# Patient Record
Sex: FEMALE | Race: WHITE | NOT HISPANIC OR LATINO | URBAN - METROPOLITAN AREA
[De-identification: names, ages, dates, MRNs, and addresses within clinical notes are randomized per-mention and may not be internally consistent; named-entity substitution may affect disease eponyms.]

---

## 2020-03-02 ENCOUNTER — TELEPHONE (OUTPATIENT)
Dept: OBGYN CLINIC | Facility: MEDICAL CENTER | Age: 25
End: 2020-03-02

## 2020-03-02 NOTE — TELEPHONE ENCOUNTER
Pt called and is 8 wks pregnant, she would like to transfer to our office  Had blood work and ultrasound done elsewhere  Informed her that we would need her prenatal records to continue setting her up at our office  Please call at [phone]  Thank you!

## 2020-03-04 ENCOUNTER — TELEPHONE (OUTPATIENT)
Dept: OBGYN CLINIC | Facility: MEDICAL CENTER | Age: 25
End: 2020-03-04

## 2020-03-04 NOTE — TELEPHONE ENCOUNTER
Pt returned call number listed in her chart incorrect  I entered the correct number [phone]  please call pt

## 2020-03-04 NOTE — TELEPHONE ENCOUNTER
States had dating US done at [place] and some bloodwork  Completed  Will contact office and have faxed to us    Will schedule appointment pending those results

## 2020-03-17 ENCOUNTER — INITIAL PRENATAL (OUTPATIENT)
Dept: OBGYN CLINIC | Facility: MEDICAL CENTER | Age: 25
End: 2020-03-17

## 2020-03-17 ENCOUNTER — TELEPHONE (OUTPATIENT)
Dept: OBGYN CLINIC | Facility: MEDICAL CENTER | Age: 25
End: 2020-03-17

## 2020-03-17 ENCOUNTER — APPOINTMENT (OUTPATIENT)
Dept: LAB | Facility: MEDICAL CENTER | Age: 25
End: 2020-03-17
Payer: COMMERCIAL

## 2020-03-17 DIAGNOSIS — Z34.91 ENCOUNTER FOR PREGNANCY RELATED EXAMINATION IN FIRST TRIMESTER: ICD-10-CM

## 2020-03-17 DIAGNOSIS — Z34.91 ENCOUNTER FOR PREGNANCY RELATED EXAMINATION IN FIRST TRIMESTER: Primary | ICD-10-CM

## 2020-03-17 DIAGNOSIS — O21.9 NAUSEA AND VOMITING IN PREGNANCY: Primary | ICD-10-CM

## 2020-03-17 LAB
ABO GROUP BLD: NORMAL
BACTERIA UR QL AUTO: ABNORMAL /HPF
BASOPHILS # BLD AUTO: 0.02 THOUSANDS/ΜL (ref 0–0.1)
BASOPHILS NFR BLD AUTO: 0 % (ref 0–1)
BILIRUB UR QL STRIP: NEGATIVE
BLD GP AB SCN SERPL QL: NEGATIVE
CAOX CRY URNS QL MICRO: ABNORMAL /HPF
CLARITY UR: ABNORMAL
COLOR UR: ABNORMAL
EOSINOPHIL # BLD AUTO: 0.07 THOUSAND/ΜL (ref 0–0.61)
EOSINOPHIL NFR BLD AUTO: 1 % (ref 0–6)
ERYTHROCYTE [DISTWIDTH] IN BLOOD BY AUTOMATED COUNT: 13.2 % (ref 11.6–15.1)
GLUCOSE UR STRIP-MCNC: NEGATIVE MG/DL
HBV SURFACE AG SER QL: NORMAL
HCT VFR BLD AUTO: 42 % (ref 34.8–46.1)
HGB BLD-MCNC: 14.5 G/DL (ref 11.5–15.4)
HGB UR QL STRIP.AUTO: NEGATIVE
IMM GRANULOCYTES # BLD AUTO: 0.03 THOUSAND/UL (ref 0–0.2)
IMM GRANULOCYTES NFR BLD AUTO: 0 % (ref 0–2)
KETONES UR STRIP-MCNC: ABNORMAL MG/DL
LEUKOCYTE ESTERASE UR QL STRIP: ABNORMAL
LYMPHOCYTES # BLD AUTO: 1.81 THOUSANDS/ΜL (ref 0.6–4.47)
LYMPHOCYTES NFR BLD AUTO: 15 % (ref 14–44)
MCH RBC QN AUTO: 29.4 PG (ref 26.8–34.3)
MCHC RBC AUTO-ENTMCNC: 34.5 G/DL (ref 31.4–37.4)
MCV RBC AUTO: 85 FL (ref 82–98)
MONOCYTES # BLD AUTO: 0.64 THOUSAND/ΜL (ref 0.17–1.22)
MONOCYTES NFR BLD AUTO: 5 % (ref 4–12)
MUCOUS THREADS UR QL AUTO: ABNORMAL
NEUTROPHILS # BLD AUTO: 9.61 THOUSANDS/ΜL (ref 1.85–7.62)
NEUTS SEG NFR BLD AUTO: 79 % (ref 43–75)
NITRITE UR QL STRIP: NEGATIVE
NON-SQ EPI CELLS URNS QL MICRO: ABNORMAL /HPF
NRBC BLD AUTO-RTO: 0 /100 WBCS
PH UR STRIP.AUTO: 6 [PH]
PLATELET # BLD AUTO: 229 THOUSANDS/UL (ref 149–390)
PMV BLD AUTO: 9.4 FL (ref 8.9–12.7)
PROT UR STRIP-MCNC: ABNORMAL MG/DL
RBC # BLD AUTO: 4.93 MILLION/UL (ref 3.81–5.12)
RBC #/AREA URNS AUTO: ABNORMAL /HPF
RH BLD: POSITIVE
RUBV IGG SERPL IA-ACNC: 19.8 IU/ML
SP GR UR STRIP.AUTO: 1.03 (ref 1–1.03)
SPECIMEN EXPIRATION DATE: NORMAL
UROBILINOGEN UR QL STRIP.AUTO: 0.2 E.U./DL
WBC # BLD AUTO: 12.18 THOUSAND/UL (ref 4.31–10.16)
WBC #/AREA URNS AUTO: ABNORMAL /HPF

## 2020-03-17 PROCEDURE — 36415 COLL VENOUS BLD VENIPUNCTURE: CPT

## 2020-03-17 PROCEDURE — 81001 URINALYSIS AUTO W/SCOPE: CPT

## 2020-03-17 PROCEDURE — NOBC: Performed by: OBSTETRICS & GYNECOLOGY

## 2020-03-17 PROCEDURE — 87086 URINE CULTURE/COLONY COUNT: CPT

## 2020-03-17 PROCEDURE — 80081 OBSTETRIC PANEL INC HIV TSTG: CPT

## 2020-03-17 RX ORDER — DOXYLAMINE SUCCINATE AND PYRIDOXINE HYDROCHLORIDE, DELAYED RELEASE TABLETS 10 MG/10 MG 10; 10 MG/1; MG/1
TABLET, DELAYED RELEASE ORAL
Qty: 60 TABLET | Refills: 2 | Status: SHIPPED | OUTPATIENT
Start: 2020-03-17 | End: 2020-04-29 | Stop reason: SDUPTHER

## 2020-03-17 NOTE — PROGRESS NOTES
OB INTAKE INTERVIEW      Pt presents for OB intake    OB History    Para Term  AB Living   2 1 1     1   SAB TAB Ectopic Multiple Live Births           1      # Outcome Date GA Lbr Nate/2nd Weight Sex Delivery Anes PTL Lv   2 Current            1 Term 11/15/15 38w0d  3033 g (6 lb 11 oz) M Vag-Spont None N LON     Twin Gestation    Hx of  delivery prior to 36 weeks 6 days:  NO     Last Menstrual Period:   Patient's last menstrual period was 2020 (exact date)  Ultrasound date:   2020 7 weeks 1 day   Estimated date of delivery:   Estimated Date of Delivery: 10/07/2020  confirmed by LMP     History of Diabetes: NO  History of Hypertension: NO      Infection Screening: Does the pt have a hx of MRSA? NO    H&P visit scheduled  ?  Interview education  Information on [facility] Pregnancy Essentials reviewed  Handouts given: Baby and Me phone tamar guide  Baby and Me support center  [facility] Morenorogel[facility] WhitmanV[facility]quez in Pregnancy information sheet   COVID-19: precautions and travel restrictions reviewed    Interview education    [facility]  Discussed genetic testing-    - pt interested    In SEQ  Screen  Information on CF and SMA carrier screening reviewed  Will let office know at next appointment if testing desired          Discussed Tdap and Influenza vaccines  Declined influenza vaccine         Depression Screening Follow-up Plan: Patient's depression screening was NEGATIVE  with an [place] score of  2                  The patient was oriented to our practice and all questions were answered    Interviewed by: Veronika Lui RN 20

## 2020-03-17 NOTE — PATIENT INSTRUCTIONS
Pregnancy at 7 to [address]:   What changes are happening to your body:  Pregnancy hormones may cause your body to go through many changes during this stage of your pregnancy  You may feel more tired than usual, and have mood swings, nausea and vomiting, and headaches  Your breasts may feel tender and swollen and you may urinate more frequently  Seek care immediately if:   · You have pain or cramping in your abdomen or low back  · You have heavy vaginal bleeding or clotting  · You pass material that looks like tissue or large clots  Collect the material and bring it with you  Contact your healthcare provider if:   · You have light bleeding  · You have chills or a fever  · You have vaginal itching, burning, or pain  · You have yellow, green, white, or foul-smelling vaginal discharge  · You have pain or burning when you urinate, less urine than usual, or pink or bloody urine  · You have questions or concerns about your condition or care  How to care for yourself at this stage of your pregnancy:   · Manage nausea and vomiting  Avoid fatty and spicy foods  Eat small meals throughout the day instead of large meals  Sarai may help to decrease nausea  Ask your healthcare provider about other ways of decreasing nausea and vomiting  · Eat a variety of healthy foods  Healthy foods include fruits, vegetables, whole-grain breads, low-fat dairy foods, beans, lean meats, and fish  Drink liquids as directed  Ask how much liquid to drink each day and which liquids are best for you  Limit caffeine to less than 200 milligrams each day  Limit your intake of fish to 2 servings each week  Choose fish low in mercury such as canned light tuna, shrimp, salmon, cod, or tilapia  Do not  eat fish high in mercury such as swordfish, tilefish, kate mackerel, and shark  · Take prenatal vitamins as directed    Your need for certain vitamins and minerals, such as folic acid, increases during pregnancy  Prenatal vitamins provide some of the extra vitamins and minerals you need  Prenatal vitamins may also help to decrease the risk of certain birth defects  · Ask how much weight you should gain each month  Too much or too little weight gain can be unhealthy for you and your baby  · Do not smoke  If you smoke, it is never too late to quit  Smoking increases your risk of a miscarriage and other health problems during your pregnancy  Smoking can cause your baby to be born too early or weigh less at birth  Ask your healthcare provider for information if you need help quitting  · Do not drink alcohol  Alcohol passes from your body to your baby through the placenta  It can affect your baby's brain development and cause fetal alcohol syndrome (FAS)  FAS is a group of conditions that causes mental, behavior, and growth problems  · Talk to your healthcare provider before you take any medicines  Many medicines may harm your baby if you take them when you are pregnant  Do not take any medicines, vitamins, herbs, or supplements without first talking to your healthcare provider  Never use illegal or street drugs (such as marijuana or cocaine) while you are pregnant  Safety tips during pregnancy:   · Avoid hot tubs and saunas  Do not use a hot tub or sauna while you are pregnant, especially during your first trimester  Hot tubs and saunas may raise your baby's temperature and increase the risk of birth defects  · Avoid toxoplasmosis  This is an infection caused by eating raw meat or being around infected cat feces  It can cause birth defects, miscarriages, and other problems  Wash your hands after you touch raw meat  Make sure any meat is well-cooked before you eat it  Avoid raw eggs and unpasteurized milk  Use gloves or ask someone else to clean your cat's litter box while you are pregnant    Changes that are happening with your baby:  By 10 weeks, your baby will be about 2 ½ inches long from the top of the head to the rump (baby's bottom)  Your baby weighs about ½ ounce  Major body organs, such as the brain, heart, and lungs, are forming  Your baby's facial features are also starting to form  What you need to know about prenatal care:  Prenatal care is a series of visits with your healthcare provider throughout your pregnancy  During the first 28 weeks of your pregnancy, you will see your healthcare provider once a month  Prenatal care can help prevent problems during pregnancy and childbirth  Your healthcare provider will ask questions about your health and any previous pregnancies you have had  He will also ask about any medicines you are taking  You may also need any of the following:  · A pap smear  will be done to check your cervix for abnormal cells  The cervix is the narrow opening at the bottom of your uterus  The cervix meets the top part of the vagina  · A pelvic exam  allows your healthcare provider to see your cervix (the bottom part of your uterus)  Your healthcare provider uses a speculum to gently open your vagina  He will check the size and shape of your uterus  · Blood tests  may be done to check for anemia or blood type  Your healthcare provider may also order other blood tests to check if you are immune to certain diseases such as Hepatitis B  He may also recommend an HIV test     · Urine tests  may also be done to check for signs of infection  · Your blood pressure and weight  will be checked  © 2017 [address] [employer] Information is for End User's use only and may not be sold, redistributed or otherwise used for commercial purposes  All illustrations and images included in CareNotes® are the copyrighted property of A D A M , Inc  or Uli Espinoza  The above information is an  only  It is not intended as medical advice for individual conditions or treatments   Talk to your doctor, nurse or pharmacist before following any medical regimen to see if it is safe and effective for you  Pregnancy at 11 to 14 Weeks   AMBULATORY CARE:   What changes are happening to your body: You are now at the end of your first trimester and entering your second trimester  Morning sickness usually goes away by this time  You may have other symptoms such as fatigue, frequent urination, and headaches  You may have gained between 2 to 4 pounds by now  Seek care immediately if:   · You have pain or cramping in your abdomen or low back  · You have heavy vaginal bleeding or clotting  · You pass material that looks like tissue or large clots  Collect the material and bring it with you  Contact your healthcare provider if:   · You cannot keep food or drinks down, and you are losing weight  · You have light bleeding  · You have chills or a fever  · You have vaginal itching, burning, or pain  · You have yellow, green, white, or foul-smelling vaginal discharge  · You have pain or burning when you urinate, less urine than usual, or pink or bloody urine  · You have questions or concerns about your condition or care  How to care for yourself at this stage of your pregnancy:   · Get plenty of rest   You may feel more tired than normal  You may need to take naps or go to bed earlier  · Manage nausea and vomiting  Avoid fatty and spicy foods  Eat small meals throughout the day instead of large meals  Sarai may help to decrease nausea  Ask your healthcare provider about other ways of decreasing nausea and vomiting  · Eat a variety of healthy foods  Healthy foods include fruits, vegetables, whole-grain breads, low-fat dairy foods, beans, lean meats, and fish  Drink liquids as directed  Ask how much liquid to drink each day and which liquids are best for you  Limit caffeine to less than 200 milligrams each day  Limit your intake of fish to 2 servings each week  Choose fish low in mercury such as canned light tuna, shrimp, salmon, cod, or tilapia   Do not  eat fish high in mercury such as swordfish, tilefish, kate mackerel, and shark  · Take prenatal vitamins as directed  Your need for certain vitamins and minerals, such as folic acid, increases during pregnancy  Prenatal vitamins provide some of the extra vitamins and minerals you need  Prenatal vitamins may also help to decrease the risk of certain birth defects  · Do not smoke  If you smoke, it is never too late to quit  Smoking increases your risk of a miscarriage and other health problems during your pregnancy  Smoking can cause your baby to be born too early or weigh less at birth  Ask your healthcare provider for information if you need help quitting  · Do not drink alcohol  Alcohol passes from your body to your baby through the placenta  It can affect your baby's brain development and cause fetal alcohol syndrome (FAS)  FAS is a group of conditions that causes mental, behavior, and growth problems  · Talk to your healthcare provider before you take any medicines  Many medicines may harm your baby if you take them when you are pregnant  Do not take any medicines, vitamins, herbs, or supplements without first talking to your healthcare provider  Never use illegal or street drugs (such as marijuana or cocaine) while you are pregnant  Safety tips during pregnancy:   · Avoid hot tubs and saunas  Do not use a hot tub or sauna while you are pregnant, especially during your first trimester  Hot tubs and saunas may raise your baby's temperature and increase the risk of birth defects  · Avoid toxoplasmosis  This is an infection caused by eating raw meat or being around infected cat feces  It can cause birth defects, miscarriages, and other problems  Wash your hands after you touch raw meat  Make sure any meat is well-cooked before you eat it  Avoid raw eggs and unpasteurized milk  Use gloves or ask someone else to clean your cat's litter box while you are pregnant    Changes that are happening with your baby: Your baby has fully formed fingernails and toenails  Your baby's heartbeat can now be heard  Ask your healthcare provider if you can listen to your baby's heartbeat  By week 14, your baby is over 4 inches long from the top of the head to the rump (baby's bottom)  Your baby weighs over 3 ounces  What you need to know about prenatal care:  During the first 28 weeks of your pregnancy, you will see your healthcare provider once a month  Prenatal care can help prevent problems during pregnancy and childbirth  Your healthcare provider will check your blood pressure and weight  You may also need any of the following:  · A urine test  may also be done to check for sugar and protein  These can be signs of gestational diabetes or infection  · Genetic disorders screening tests  may be offered to you  This screening test checks your baby's risk of genetic disorders such as Down syndrome  The screening test includes a blood test and ultrasound  · Your baby's heart rate  will be checked  © 2017 [address] [employer]  Information is for End User's use only and may not be sold, redistributed or otherwise used for commercial purposes  All illustrations and images included in CareNotes® are the copyrighted property of A D A M , Inc  or Uli Espinoza  The above information is an  only  It is not intended as medical advice for individual conditions or treatments  Talk to your doctor, nurse or pharmacist before following any medical regimen to see if it is safe and effective for you

## 2020-03-18 LAB
BACTERIA UR CULT: NORMAL
HIV 1+2 AB+HIV1 P24 AG SERPL QL IA: NORMAL
RPR SER QL: NORMAL

## 2020-03-24 ENCOUNTER — TELEPHONE (OUTPATIENT)
Dept: OBGYN CLINIC | Facility: MEDICAL CENTER | Age: 25
End: 2020-03-24

## 2020-03-24 NOTE — TELEPHONE ENCOUNTER
Per Tahir precert is not required for global maternity  Services will be covered at 80%  Once patient meets deductible services will be covered at 80% up to [place] [facility]

## 2020-03-31 ENCOUNTER — TELEMEDICINE (OUTPATIENT)
Dept: OBGYN CLINIC | Facility: MEDICAL CENTER | Age: 25
End: 2020-03-31

## 2020-03-31 VITALS — WEIGHT: 115 LBS

## 2020-03-31 DIAGNOSIS — O21.0 HYPEREMESIS GRAVIDARUM: ICD-10-CM

## 2020-03-31 DIAGNOSIS — Z3A.12 12 WEEKS GESTATION OF PREGNANCY: Primary | ICD-10-CM

## 2020-03-31 DIAGNOSIS — O30.041 DICHORIONIC DIAMNIOTIC TWIN PREGNANCY IN FIRST TRIMESTER: ICD-10-CM

## 2020-03-31 DIAGNOSIS — Z34.91 FIRST TRIMESTER PREGNANCY: ICD-10-CM

## 2020-03-31 DIAGNOSIS — Z11.3 SCREENING EXAMINATION FOR STD (SEXUALLY TRANSMITTED DISEASE): ICD-10-CM

## 2020-03-31 PROCEDURE — PNV: Performed by: OBSTETRICS & GYNECOLOGY

## 2020-03-31 RX ORDER — ONDANSETRON 4 MG/1
4 TABLET, FILM COATED ORAL EVERY 8 HOURS PRN
Qty: 30 TABLET | Refills: 2 | Status: SHIPPED | OUTPATIENT
Start: 2020-03-31 | End: 2020-04-29 | Stop reason: SDUPTHER

## 2020-03-31 NOTE — PROGRESS NOTES
Virtual Regular Visit         Reason for visit is initial OB visit    Encounter provider Antony Salvador MD    Provider located at [address] [place] [address]  [facility] [address] [address]      Recent Visits  Date Type Provider Dept   20 Telephone Sundeep Sparks recent visits within past 7 days and meeting all other requirements     Today's Visits  Date Type Provider Dept   [facility]'s visits and meeting all other requirements     Future Appointments  No visits were found meeting these conditions  Showing future appointments within next 150 days and meeting all other requirements        The patient was identified by name and date of birth  Vida Jarvis was informed that this is a telemedicine visit and that the visit is being conducted through [employer]  My office door was closed  No one else was in the room  She acknowledged consent and understanding of privacy and security of the video platform  The patient has agreed to participate and understands they can discontinue the visit at any time  Patient is aware this is a billable service  Subjective    Vida Jarvis is a 30 y o   at 14w9d with dichorionic, diamniotic twins who presents for routine prenatal visit  She is reporting severe nausea and vomiting of pregnancy  Notes she has already lost about 10-12lbs since finding out she was pregnant  Nauseated all day long, vomits frequently  Also notes she had a fever last night to 100 4, resolved now  No other symptoms with the fever except vomiting  Denies cough or exposure to known sick contact  Patient notes that this pregnancy was planned  She was not using contraception at the time  She reports she is certain of her LMP and that she has regular menses   She has has no vaginal bleeding since her LMP  Patient's PMH is uncomplicated  Her prior pregnancies have been uncomplicated  She denies a hx of STD/STI, denies a hx of TB or close contacts with persons with TB  She denies a family history of inheritable conditions such as physical or intellectual disabilities, birth defects, blood disorders, heart or neural tube defects  She has never had MRSA  She denies recent travel or travel planned in the near future  Patient Active Problem List   Diagnosis    12 weeks gestation of pregnancy    First trimester pregnancy    Dichorionic diamniotic twin pregnancy in first trimester    Hyperemesis gravidarum       Past Medical History:   Diagnosis Date    Depression     no medications    Urinary tract infection     Varicella        History reviewed  No pertinent surgical history  Current Outpatient Medications   Medication Sig Dispense Refill    Doxylamine-Pyridoxine (Diclegis) 10-10 MG TBEC Start with 2tabs QHS If no improvement in 48 hours, may increase to include 1 tablet QAM and 1 tablet QPM to max  Of 4 tabs/24hours 60 tablet 2    Prenatal Vit-Fe Fumarate-FA (PRENATAL 19 PO) Take 1 tablet by mouth daily      ondansetron (ZOFRAN) 4 mg tablet Take 1 tablet (4 mg total) by mouth every 8 (eight) hours as needed for nausea or vomiting 30 tablet 2     No current facility-administered medications for this visit  No Known Allergies    Review of Systems   Constitutional: Positive for fatigue and fever  Negative for chills  HENT: Negative for congestion, rhinorrhea and sinus pressure  Respiratory: Negative for cough and shortness of breath  Gastrointestinal: Positive for nausea and vomiting  Negative for abdominal distention, abdominal pain, constipation and diarrhea  Genitourinary: Positive for pelvic pain  Negative for difficulty urinating, dysuria, frequency, genital sores, urgency, vaginal bleeding, vaginal discharge and vaginal pain     Musculoskeletal: Negative for arthralgias and myalgias  Skin: Negative for rash  Physical Exam   Constitutional: She appears well-developed and well-nourished  No distress  Eyes: Conjunctivae are normal  Right eye exhibits no discharge  Left eye exhibits no discharge  No scleral icterus  Pulmonary/Chest: Effort normal  No respiratory distress  Abdominal:   Intermittently wretching on exam   Neurological: She is alert  Skin: She is not diaphoretic  Psychiatric: She has a normal mood and affect  Her behavior is normal         Assessment  30 y o  Eugenio Starkey at [address] with dichorionic, diamniotic twins who presents for routine prenatal visit  Plan  Diagnoses and all orders for this visit:    12 weeks gestation of pregnancy  First trimester pregnancy  - Discussed delivering providers, anticipated PN course/visit schedule  - Recommended genetic screening, declines  - Referred to [facility] for 1st tri US/consult due to twins and for level 2  - Reviewed early pregnancy precautions  - GC ordered  - Return in 4wks for prenatal     Dichorionic diamniotic twin pregnancy in first trimester  -     Ambulatory Referral to Maternal Fetal Medicine; Future    Hyperemesis gravidarum  -     ondansetron (ZOFRAN) 4 mg tablet; Take 1 tablet (4 mg total) by mouth every 8 (eight) hours as needed for nausea or vomiting  - Continue diclegis as prescribed        I spent 37 minutes with the patient during this visit

## 2020-04-29 ENCOUNTER — ROUTINE PRENATAL (OUTPATIENT)
Dept: OBGYN CLINIC | Facility: MEDICAL CENTER | Age: 25
End: 2020-04-29
Payer: COMMERCIAL

## 2020-04-29 VITALS — DIASTOLIC BLOOD PRESSURE: 60 MMHG | WEIGHT: 120 LBS | SYSTOLIC BLOOD PRESSURE: 106 MMHG

## 2020-04-29 DIAGNOSIS — O21.0 HYPEREMESIS GRAVIDARUM: ICD-10-CM

## 2020-04-29 DIAGNOSIS — O21.9 NAUSEA AND VOMITING IN PREGNANCY: Primary | ICD-10-CM

## 2020-04-29 DIAGNOSIS — Z3A.17 17 WEEKS GESTATION OF PREGNANCY: ICD-10-CM

## 2020-04-29 DIAGNOSIS — Z3A.12 12 WEEKS GESTATION OF PREGNANCY: ICD-10-CM

## 2020-04-29 DIAGNOSIS — Z11.3 SCREENING EXAMINATION FOR STD (SEXUALLY TRANSMITTED DISEASE): ICD-10-CM

## 2020-04-29 DIAGNOSIS — Z34.92 SECOND TRIMESTER PREGNANCY: ICD-10-CM

## 2020-04-29 LAB
EXTERNAL CHLAMYDIA RESULT: NEGATIVE
N GONORRHOEA RRNA SPEC QL PROBE: NEGATIVE

## 2020-04-29 PROCEDURE — 99214 OFFICE O/P EST MOD 30 MIN: CPT | Performed by: NURSE PRACTITIONER

## 2020-04-29 RX ORDER — DOXYLAMINE SUCCINATE AND PYRIDOXINE HYDROCHLORIDE, DELAYED RELEASE TABLETS 10 MG/10 MG 10; 10 MG/1; MG/1
TABLET, DELAYED RELEASE ORAL
Qty: 60 TABLET | Refills: 2 | Status: SHIPPED | OUTPATIENT
Start: 2020-04-29 | End: 2020-05-05 | Stop reason: SDUPTHER

## 2020-04-29 RX ORDER — ONDANSETRON 4 MG/1
4 TABLET, FILM COATED ORAL EVERY 8 HOURS PRN
Qty: 30 TABLET | Refills: 2 | Status: SHIPPED | OUTPATIENT
Start: 2020-04-29 | End: 2020-05-05 | Stop reason: SDUPTHER

## 2020-04-30 ENCOUNTER — TELEPHONE (OUTPATIENT)
Dept: PERINATAL CARE | Facility: OTHER | Age: 25
End: 2020-04-30

## 2020-05-01 LAB
C TRACH RRNA SPEC QL NAA+PROBE: NEGATIVE
CYTOLOGIST CVX/VAG CYTO: NORMAL
DX ICD CODE: NORMAL
N GONORRHOEA RRNA SPEC QL NAA+PROBE: NEGATIVE
OTHER STN SPEC: NORMAL
OTHER STN SPEC: NORMAL
PATH REPORT.FINAL DX SPEC: NORMAL
SL AMB NOTE:: NORMAL
SL AMB SPECIMEN ADEQUACY: NORMAL
SL AMB TEST METHODOLOGY: NORMAL

## 2020-05-05 DIAGNOSIS — Z3A.12 12 WEEKS GESTATION OF PREGNANCY: ICD-10-CM

## 2020-05-05 DIAGNOSIS — O21.9 NAUSEA AND VOMITING IN PREGNANCY: ICD-10-CM

## 2020-05-05 DIAGNOSIS — O21.0 HYPEREMESIS GRAVIDARUM: ICD-10-CM

## 2020-05-05 RX ORDER — DOXYLAMINE SUCCINATE AND PYRIDOXINE HYDROCHLORIDE, DELAYED RELEASE TABLETS 10 MG/10 MG 10; 10 MG/1; MG/1
TABLET, DELAYED RELEASE ORAL
Qty: 120 TABLET | Refills: 2 | Status: SHIPPED | OUTPATIENT
Start: 2020-05-05 | End: 2020-09-07 | Stop reason: HOSPADM

## 2020-05-05 RX ORDER — ONDANSETRON 4 MG/1
4 TABLET, FILM COATED ORAL EVERY 8 HOURS PRN
Qty: 90 TABLET | Refills: 2 | Status: SHIPPED | OUTPATIENT
Start: 2020-05-05 | End: 2020-08-14 | Stop reason: SDUPTHER

## 2020-05-19 ENCOUNTER — TELEPHONE (OUTPATIENT)
Dept: PERINATAL CARE | Facility: CLINIC | Age: 25
End: 2020-05-19

## 2020-05-27 ENCOUNTER — ROUTINE PRENATAL (OUTPATIENT)
Dept: OBGYN CLINIC | Facility: MEDICAL CENTER | Age: 25
End: 2020-05-27

## 2020-05-27 VITALS — DIASTOLIC BLOOD PRESSURE: 60 MMHG | SYSTOLIC BLOOD PRESSURE: 100 MMHG | WEIGHT: 133.7 LBS

## 2020-05-27 DIAGNOSIS — Z3A.21 21 WEEKS GESTATION OF PREGNANCY: ICD-10-CM

## 2020-05-27 DIAGNOSIS — O30.041 DICHORIONIC DIAMNIOTIC TWIN PREGNANCY IN FIRST TRIMESTER: Primary | ICD-10-CM

## 2020-05-27 PROCEDURE — PNV: Performed by: OBSTETRICS & GYNECOLOGY

## 2020-06-04 ENCOUNTER — TELEPHONE (OUTPATIENT)
Dept: PERINATAL CARE | Facility: CLINIC | Age: 25
End: 2020-06-04

## 2020-06-05 ENCOUNTER — ULTRASOUND (OUTPATIENT)
Dept: PERINATAL CARE | Facility: CLINIC | Age: 25
End: 2020-06-05
Payer: COMMERCIAL

## 2020-06-05 VITALS
HEART RATE: 85 BPM | SYSTOLIC BLOOD PRESSURE: 122 MMHG | TEMPERATURE: 96.1 F | BODY MASS INDEX: 23.78 KG/M2 | HEIGHT: 63 IN | DIASTOLIC BLOOD PRESSURE: 66 MMHG | WEIGHT: 134.2 LBS

## 2020-06-05 DIAGNOSIS — Z3A.22 22 WEEKS GESTATION OF PREGNANCY: ICD-10-CM

## 2020-06-05 DIAGNOSIS — O30.042 DICHORIONIC DIAMNIOTIC TWIN PREGNANCY IN SECOND TRIMESTER: Primary | ICD-10-CM

## 2020-06-05 DIAGNOSIS — Z36.86 ENCOUNTER FOR ANTENATAL SCREENING FOR CERVICAL LENGTH: ICD-10-CM

## 2020-06-05 PROCEDURE — 99242 OFF/OP CONSLTJ NEW/EST SF 20: CPT | Performed by: OBSTETRICS & GYNECOLOGY

## 2020-06-05 PROCEDURE — 76812 OB US DETAILED ADDL FETUS: CPT | Performed by: OBSTETRICS & GYNECOLOGY

## 2020-06-05 PROCEDURE — 76811 OB US DETAILED SNGL FETUS: CPT | Performed by: OBSTETRICS & GYNECOLOGY

## 2020-06-05 PROCEDURE — 76817 TRANSVAGINAL US OBSTETRIC: CPT | Performed by: OBSTETRICS & GYNECOLOGY

## 2020-06-05 RX ORDER — CALCIUM GLUCONATE 45(500) MG
1500 TABLET ORAL DAILY
COMMUNITY

## 2020-06-05 RX ORDER — FERROUS SULFATE 325(65) MG
325 TABLET ORAL DAILY
COMMUNITY

## 2020-06-24 ENCOUNTER — ROUTINE PRENATAL (OUTPATIENT)
Dept: OBGYN CLINIC | Facility: MEDICAL CENTER | Age: 25
End: 2020-06-24

## 2020-06-24 VITALS — BODY MASS INDEX: 24.8 KG/M2 | SYSTOLIC BLOOD PRESSURE: 110 MMHG | WEIGHT: 140 LBS | DIASTOLIC BLOOD PRESSURE: 60 MMHG

## 2020-06-24 DIAGNOSIS — O30.042 DICHORIONIC DIAMNIOTIC TWIN PREGNANCY IN SECOND TRIMESTER: Primary | ICD-10-CM

## 2020-06-24 PROCEDURE — PNV: Performed by: OBSTETRICS & GYNECOLOGY

## 2020-07-13 ENCOUNTER — TELEPHONE (OUTPATIENT)
Dept: OBGYN CLINIC | Facility: MEDICAL CENTER | Age: 25
End: 2020-07-13

## 2020-07-13 NOTE — TELEPHONE ENCOUNTER
Code for support belly band    Code for the sugar testing kit (unable to do the tolerance test and wants to check sugars)

## 2020-07-15 ENCOUNTER — ROUTINE PRENATAL (OUTPATIENT)
Dept: OBGYN CLINIC | Facility: MEDICAL CENTER | Age: 25
End: 2020-07-15
Payer: COMMERCIAL

## 2020-07-15 VITALS — BODY MASS INDEX: 25.86 KG/M2 | SYSTOLIC BLOOD PRESSURE: 110 MMHG | WEIGHT: 146 LBS | DIASTOLIC BLOOD PRESSURE: 60 MMHG

## 2020-07-15 DIAGNOSIS — Z3A.28 28 WEEKS GESTATION OF PREGNANCY: ICD-10-CM

## 2020-07-15 DIAGNOSIS — O30.042 DICHORIONIC DIAMNIOTIC TWIN PREGNANCY IN SECOND TRIMESTER: ICD-10-CM

## 2020-07-15 DIAGNOSIS — Z3A.28 28 WEEKS GESTATION OF PREGNANCY: Primary | ICD-10-CM

## 2020-07-15 DIAGNOSIS — Z34.92 SECOND TRIMESTER PREGNANCY: ICD-10-CM

## 2020-07-15 LAB
BASOPHILS # BLD AUTO: 0.04 THOUSANDS/ΜL (ref 0–0.1)
BASOPHILS NFR BLD AUTO: 0 % (ref 0–1)
EOSINOPHIL # BLD AUTO: 0.12 THOUSAND/ΜL (ref 0–0.61)
EOSINOPHIL NFR BLD AUTO: 1 % (ref 0–6)
ERYTHROCYTE [DISTWIDTH] IN BLOOD BY AUTOMATED COUNT: 13.3 % (ref 11.6–15.1)
HCT VFR BLD AUTO: 33.8 % (ref 34.8–46.1)
HGB BLD-MCNC: 10.8 G/DL (ref 11.5–15.4)
IMM GRANULOCYTES # BLD AUTO: 0.15 THOUSAND/UL (ref 0–0.2)
IMM GRANULOCYTES NFR BLD AUTO: 1 % (ref 0–2)
LYMPHOCYTES # BLD AUTO: 1.7 THOUSANDS/ΜL (ref 0.6–4.47)
LYMPHOCYTES NFR BLD AUTO: 15 % (ref 14–44)
MCH RBC QN AUTO: 29 PG (ref 26.8–34.3)
MCHC RBC AUTO-ENTMCNC: 32 G/DL (ref 31.4–37.4)
MCV RBC AUTO: 91 FL (ref 82–98)
MONOCYTES # BLD AUTO: 0.78 THOUSAND/ΜL (ref 0.17–1.22)
MONOCYTES NFR BLD AUTO: 7 % (ref 4–12)
NEUTROPHILS # BLD AUTO: 8.33 THOUSANDS/ΜL (ref 1.85–7.62)
NEUTS SEG NFR BLD AUTO: 76 % (ref 43–75)
NRBC BLD AUTO-RTO: 0 /100 WBCS
PLATELET # BLD AUTO: 237 THOUSANDS/UL (ref 149–390)
PMV BLD AUTO: 9 FL (ref 8.9–12.7)
RBC # BLD AUTO: 3.72 MILLION/UL (ref 3.81–5.12)
WBC # BLD AUTO: 11.12 THOUSAND/UL (ref 4.31–10.16)

## 2020-07-15 PROCEDURE — PNV: Performed by: OBSTETRICS & GYNECOLOGY

## 2020-07-15 PROCEDURE — 85025 COMPLETE CBC W/AUTO DIFF WBC: CPT | Performed by: OBSTETRICS & GYNECOLOGY

## 2020-07-15 PROCEDURE — 36415 COLL VENOUS BLD VENIPUNCTURE: CPT | Performed by: OBSTETRICS & GYNECOLOGY

## 2020-07-15 RX ORDER — BLOOD-GLUCOSE METER
KIT MISCELLANEOUS
Qty: 1 EACH | Refills: 0 | Status: SHIPPED | OUTPATIENT
Start: 2020-07-15 | End: 2020-10-16 | Stop reason: ALTCHOICE

## 2020-07-15 RX ORDER — LANCETS 28 GAUGE
EACH MISCELLANEOUS
Qty: 100 EACH | Refills: 0 | OUTPATIENT
Start: 2020-07-15

## 2020-07-15 RX ORDER — LANCETS 28 GAUGE
EACH MISCELLANEOUS
Qty: 50 EACH | Refills: 0 | Status: SHIPPED | OUTPATIENT
Start: 2020-07-15 | End: 2020-10-16 | Stop reason: ALTCHOICE

## 2020-07-15 NOTE — TELEPHONE ENCOUNTER
Pharmacy requested One Touch meter instead of Freestyle per patients formulary  Ordered verbally with pharmacy staff

## 2020-07-15 NOTE — PROGRESS NOTES
Assessment  30 y o  Bala Bird at 28w0d presenting for routine prenatal visit  Plan  Diagnoses and all orders for this visit:    28 weeks gestation of pregnancy  Second trimester pregnancy  Dichorionic diamniotic twin pregnancy in second trimester  -  labor precautions  - [address] encouraged  - CBC collected  - Will check glucose at home  - Return in 2wks for PN/review glucose      ____________________________________________________________        Subjective    Dianne Henao is a 30 y o  Bala Pelon at 28w0d who presents for routine prenatal visit  She is feeling more pelvic pressure, worse when standing prolonged periods  Also gets kandi crawford ctxns in same scenarios  Both improve with rest   She denies regular contractions, loss of fluid, or vaginal bleeding  She feels regular fetal movements  Patient initially was going to do glucola, however after research she prefers to check fingersticks  Advised on how to check  Rx for glucometer provided  Also declines Tdap as its "not tested on pregnant women " Advised on CDC recommendations and it's common use, which leads to observational studies, as well as rationale for vaccination  Declines  Pregnancy Problems:  Patient Active Problem List   Diagnosis    28 weeks gestation of pregnancy    Second trimester pregnancy    Dichorionic diamniotic twin pregnancy in second trimester    Hyperemesis gravidarum         Objective  /60   Wt 66 2 kg (146 lb)   LMP 2020 (Exact Date)   BMI 25 86 kg/m²     FHT: 130 BPM (A) / 144 BPM (B)   Uterine Size: consistent with twins     Physical Exam:  Physical Exam   Constitutional: She appears well-developed and well-nourished  No distress  HENT:   Head: Normocephalic and atraumatic  Eyes: No scleral icterus  Pulmonary/Chest: Effort normal  No accessory muscle usage  No respiratory distress  Abdominal: She exhibits distension (gravid)  There is no tenderness  There is no rebound and no guarding  Skin: Skin is warm and dry  No rash noted  No erythema  Psychiatric: She has a normal mood and affect   Her behavior is normal

## 2020-07-21 ENCOUNTER — TELEPHONE (OUTPATIENT)
Dept: OBGYN CLINIC | Facility: MEDICAL CENTER | Age: 25
End: 2020-07-21

## 2020-07-21 NOTE — TELEPHONE ENCOUNTER
Requesting a script for a belly band  She called her insurance and they said they would approve it  She will pick it up at her next appointment

## 2020-07-23 ENCOUNTER — TELEPHONE (OUTPATIENT)
Dept: PERINATAL CARE | Facility: CLINIC | Age: 25
End: 2020-07-23

## 2020-07-23 NOTE — TELEPHONE ENCOUNTER
Attempted to reach patient by phone and left voicemail to confirm appointment for MFM ultrasound  1 support person ( must be over the age of 15) may accompany you for your appointment  If you or your support person have traveled outside the state in the past 2 weeks, please call and notify our office today #[phone]  You and your support person must wear a mask ,covering nose and mouth,during your entire visit  You and your support person will have temperature screened upon arrival     Please call our office prior to entering the building  Check in and rooming questions will be done via phone  We will give you directions when to enter for your appointment  Inside office # provided:  Patrizia Olivares line: [phone]  [place] line:  [phone]  [facility] line:  [phone] line:  [phone]  Juliet Poe line:  [phone]  [place] line:  [phone]    [facility] does not allow cell phone use, recording device or streaming during ultrasound  IF you are not feeling well- cough, fever, shortness of breath or any flu like symptoms, contact your primary care physician or [address] [place]    Any questions with these instructions please call Maternal Fetal Medicine nurse line today @ # [phone]

## 2020-07-24 ENCOUNTER — ULTRASOUND (OUTPATIENT)
Dept: PERINATAL CARE | Facility: CLINIC | Age: 25
End: 2020-07-24
Payer: COMMERCIAL

## 2020-07-24 VITALS
DIASTOLIC BLOOD PRESSURE: 56 MMHG | SYSTOLIC BLOOD PRESSURE: 120 MMHG | WEIGHT: 147.4 LBS | HEART RATE: 88 BPM | BODY MASS INDEX: 26.12 KG/M2 | HEIGHT: 63 IN | TEMPERATURE: 96.5 F

## 2020-07-24 DIAGNOSIS — O30.043 DICHORIONIC DIAMNIOTIC TWIN PREGNANCY IN THIRD TRIMESTER: Primary | ICD-10-CM

## 2020-07-24 DIAGNOSIS — Z3A.29 29 WEEKS GESTATION OF PREGNANCY: ICD-10-CM

## 2020-07-24 PROCEDURE — 99212 OFFICE O/P EST SF 10 MIN: CPT | Performed by: OBSTETRICS & GYNECOLOGY

## 2020-07-24 PROCEDURE — 76816 OB US FOLLOW-UP PER FETUS: CPT | Performed by: OBSTETRICS & GYNECOLOGY

## 2020-07-24 NOTE — PROGRESS NOTES
The patient was seen today for an ultrasound  Please see ultrasound report (located under Ob Procedures) for additional details  Thank you very much for allowing us to participate in the care of this very nice patient  Should you have any questions, please do not hesitate to contact me  MD Sol Lanierucah  Attending Physician, Cherrie

## 2020-07-31 ENCOUNTER — ROUTINE PRENATAL (OUTPATIENT)
Dept: OBGYN CLINIC | Facility: MEDICAL CENTER | Age: 25
End: 2020-07-31

## 2020-07-31 VITALS — SYSTOLIC BLOOD PRESSURE: 122 MMHG | BODY MASS INDEX: 26.36 KG/M2 | WEIGHT: 148.8 LBS | DIASTOLIC BLOOD PRESSURE: 68 MMHG

## 2020-07-31 DIAGNOSIS — Z3A.30 30 WEEKS GESTATION OF PREGNANCY: ICD-10-CM

## 2020-07-31 DIAGNOSIS — O30.043 DICHORIONIC DIAMNIOTIC TWIN PREGNANCY IN THIRD TRIMESTER: ICD-10-CM

## 2020-07-31 PROCEDURE — PNV: Performed by: NURSE PRACTITIONER

## 2020-07-31 NOTE — PROGRESS NOTES
Lisseth Franco is a 30y o  year old  at 30w2d for routine prenatal visit    + FM, no vaginal bleeding, contractions, or LOF  Complaints: Yes wants band for belly pressure , will wait for next week when sudhakar comes back to order a band for her  Most recent ultrasound and labs reviewed  delined her glucose brings in her weekly bs and all but 2 normal  2/2 eating cookies  Declines tdap Has monthly mfm visits  [address] and ptl precautions reviewed  Will make next visit virtual, then following one in person  C/o distance to our office     Gave stork form, wants to review at home

## 2020-08-04 ENCOUNTER — TELEPHONE (OUTPATIENT)
Dept: OBGYN CLINIC | Facility: MEDICAL CENTER | Age: 25
End: 2020-08-04

## 2020-08-04 NOTE — TELEPHONE ENCOUNTER
----- Message from [address] sent at 8/4/2020 10:17 AM EDT -----  Thanks I will order it now  ----- Message -----  From: Timothy Zhou  Sent: 8/4/2020  10:16 AM EDT  To: Anel Joshua is 148 lbs  ----- Message -----  From: [address]  Sent: 8/3/2020   8:50 AM EDT  To: Timothy Zhou    Good Morning,  Would you happen to know what size is needed for that patient so I can order it before her appt   ----- Message -----  From: Timothy Zhou  Sent: 7/31/2020  11:10 AM EDT  To: Suraj Scott!! Welcome back !!!  Csn you please order a belly binder for pt?  Thanks !!!

## 2020-08-10 ENCOUNTER — TELEPHONE (OUTPATIENT)
Dept: OBGYN CLINIC | Facility: MEDICAL CENTER | Age: 25
End: 2020-08-10

## 2020-08-14 ENCOUNTER — TELEMEDICINE (OUTPATIENT)
Dept: OBGYN CLINIC | Facility: MEDICAL CENTER | Age: 25
End: 2020-08-14

## 2020-08-14 DIAGNOSIS — O21.0 HYPEREMESIS GRAVIDARUM: ICD-10-CM

## 2020-08-14 DIAGNOSIS — Z3A.12 12 WEEKS GESTATION OF PREGNANCY: ICD-10-CM

## 2020-08-14 DIAGNOSIS — O21.9 NAUSEA AND VOMITING IN PREGNANCY: ICD-10-CM

## 2020-08-14 DIAGNOSIS — Z3A.32 32 WEEKS GESTATION OF PREGNANCY: Primary | ICD-10-CM

## 2020-08-14 PROCEDURE — PNV: Performed by: NURSE PRACTITIONER

## 2020-08-14 RX ORDER — ONDANSETRON 4 MG/1
4 TABLET, FILM COATED ORAL EVERY 8 HOURS PRN
Qty: 90 TABLET | Refills: 2 | Status: SHIPPED | OUTPATIENT
Start: 2020-08-14 | End: 2020-10-16 | Stop reason: ALTCHOICE

## 2020-08-14 NOTE — PROGRESS NOTES
Virtual Regular Visit      Assessment/Plan:    Problem List Items Addressed This Visit        Digestive    Hyperemesis gravidarum    Relevant Medications    ondansetron (ZOFRAN) 4 mg tablet      Other Visit Diagnoses     12 weeks gestation of pregnancy        Relevant Medications    ondansetron (ZOFRAN) 4 mg tablet    Nausea and vomiting in pregnancy        Relevant Medications    ondansetron (ZOFRAN) 4 mg tablet               Reason for visit is   Chief Complaint   Patient presents with    Routine Prenatal Visit    Virtual Regular Visit        Encounter provider ALY Plascencia    Provider located at [address] [place] [address]  [facility] [address] [address]      Recent Visits  Date Type Provider Dept   08/10/20 Telephone Aydee Alvarez [facility] recent visits within past 7 days and meeting all other requirements     Today's Visits  Date Type Provider Dept   08/14/20 [facility] today's visits and meeting all other requirements     Future Appointments  No visits were found meeting these conditions  Showing future appointments within next 150 days and meeting all other requirements        The patient was identified by name and date of birth  Efrain Vinson was informed that this is a telemedicine visit and that the visit is being conducted through [employer]  My office door was closed  No one else was in the room  She acknowledged consent and understanding of privacy and security of the video platform  The patient has agreed to participate and understands they can discontinue the visit at any time  Patient is aware this is a billable service  Subjective  Efrain Vinson is a 30 y o  female  Who is here for virtual visit  She is 32 2 weeks twin gestation    No vag bleeding/lof or reg cxs occas contractions reviewed with water and rest   Still having some nausea, wants refill of zofran  Has back pain, still waiting for the belly band that sudhakar put in the order for  No headaches, visual changes or ruq pain  doen't have a bp cuff at home  All appps in the future will be in person  Has visit with us 7/25, and oseas on 8/28  [facility] labor precautions reviewed        HPI     Past Medical History:   Diagnosis Date    Depression     no medications    Urinary tract infection     Varicella        No past surgical history on file  Current Outpatient Medications   Medication Sig Dispense Refill    calcium gluconate 500 mg tablet Take 1,500 mg by mouth daily Can take 1 tab with each meal      Doxylamine-Pyridoxine (Diclegis) 10-10 MG TBEC Start with 2tabs QHS If no improvement in 48 hours, may increase to include 1 tablet QAM and 1 tablet QPM to max  Of 4 tabs/24hours 120 tablet 2    ferrous sulfate 325 (65 Fe) mg tablet Take 325 mg by mouth daily Prior to bedtime on an empty stomach  Do not take with calcium      ondansetron (ZOFRAN) 4 mg tablet Take 1 tablet (4 mg total) by mouth every 8 (eight) hours as needed for nausea or vomiting 90 tablet 2    Prenatal Vit-Fe Fumarate-FA (PRENATAL 19 PO) Take 1 tablet by mouth daily      glucose blood (FREESTYLE TEST STRIPS) test strip Use as instructed (Patient not taking: Reported on 8/14/2020) 50 each 0    glucose monitoring kit (FREESTYLE) monitoring kit Check glucose fasting and 2 hours after each meal for 1 week  Record all values and bring to your next OB appointment  (Patient not taking: Reported on 8/14/2020) 1 each 0    Lancets (FREESTYLE) lancets Use as instructed (Patient not taking: Reported on 8/14/2020) 50 each 0     No current facility-administered medications for this visit  No Known Allergies    Review of Systems    Video Exam    There were no vitals filed for this visit      Physical Exam     I spent 12 minutes directly with the patient during this visit      VIRTUAL VISIT DISCLAIMER    Cleophas Cogan acknowledges that she has consented to an online visit or consultation  She understands that the online visit is based solely on information provided by her, and that, in the absence of a face-to-face physical evaluation by the physician, the diagnosis she receives is both limited and provisional in terms of accuracy and completeness  This is not intended to replace a full medical face-to-face evaluation by the physician  Cleophas Cogan understands and accepts these terms

## 2020-08-25 ENCOUNTER — ROUTINE PRENATAL (OUTPATIENT)
Dept: OBGYN CLINIC | Facility: MEDICAL CENTER | Age: 25
End: 2020-08-25

## 2020-08-25 VITALS — DIASTOLIC BLOOD PRESSURE: 70 MMHG | WEIGHT: 154 LBS | SYSTOLIC BLOOD PRESSURE: 108 MMHG | BODY MASS INDEX: 27.28 KG/M2

## 2020-08-25 DIAGNOSIS — O30.043 DICHORIONIC DIAMNIOTIC TWIN PREGNANCY IN THIRD TRIMESTER: Primary | ICD-10-CM

## 2020-08-25 PROBLEM — Z28.21 REFUSES TETANUS, DIPHTHERIA, AND ACELLULAR PERTUSSIS (TDAP) VACCINATION: Status: ACTIVE | Noted: 2020-08-25

## 2020-08-25 PROCEDURE — PNV: Performed by: OBSTETRICS & GYNECOLOGY

## 2020-08-25 NOTE — PROGRESS NOTES
TWIN IUP       8/14- Hyperemesis - Zofran started  Today only complaints are the swelling of the feet   BP today (normal     Did not do her GTT wanted to do finger sticks   Review of the fingerstick witch were only pp and are normal   She never did fasting will do over the next few days to make sure normal   CBC 10 8/33  Iron to continue         Last sonogram - 7/24  Baby A breech - 1240 gm   Baby B transverse - 1306-  Concordant growth   Next scan 8/28    Pt refused tdap she states no studies went over the risk and she is aware and been counseled

## 2020-08-27 ENCOUNTER — TELEPHONE (OUTPATIENT)
Dept: PERINATAL CARE | Facility: CLINIC | Age: 25
End: 2020-08-27

## 2020-08-27 NOTE — TELEPHONE ENCOUNTER
Spoke with patient and confirmed appointment with MFM  1 support person ( must be over age of 15) may accompany patient  Will you and your support person be able to wear a mask ,without a valve , during entire appointment? yes   To minimize your exposure in our waiting area,check in and rooming questions will be done via phone  When you arrive in the parking lot please call the following inside line # prior to entering office:    Sarah Hagan [phone] line: [phone] line:  [phone] line: [phone]  Haily Workman line:  [phone]  [place] line: [phone]    Have you or your support person traveled outside the state in the last 2 weeks?no   If yes, what state did you travel to? no     Do you or your support person have:  Fever or flu- like symptoms?no  Symptoms of upper respiratory infection like runny nose, sore throat or cough? no  Do you have new headache that you have not had in the past?no  Have you experienced any new shortness of breath recently?no  Do you have any new loss of taste or smell?no  Do you have any new diarrhea, nausea or vomiting?no  Have you recently been in contact with anyone who has been sick or diagnosed with COVID-19 infection?no  Have you been recommended to quarantine because of an exposure to a confirmed positive COVID19 person?no  You and your support person will have temperature screening upon arrival   Patient verbalized understanding of all instructions

## 2020-08-27 NOTE — PATIENT INSTRUCTIONS
Thank you for choosing us for your  care today  If you have any questions about your ultrasound or care, please do not hesitate to contact us or your primary obstetrician  Some general instructions for your pregnancy are:     Exercise: Aim for 22 minutes per day (150 minutes per week!) of regular exercise  This is obviously hard to do during a pandemic but walking is great   Nutrition: aim for calcium-rich and iron-rich foods as well as healthy sources of protein  This will help you gain a healthy amount of weight   Protect against the flu: get yourself and your entire household vaccinated against influenza   Protect against coronavirus: practice social distancing, wear a mask in public, and wash your hands often  This virus can be spread easily by people without symptoms  Notify your primary care doctor if you have any symptoms including cough, shortness of breath or difficulty breathing, fever, chills, muscle pain, sore throat, or new loss of taste or smell   Learn about Preeclampsia: preeclampsia is a common, serious complication in pregnancy  A blood pressure of 140mmHg (top number or systolic) OR [id number] (bottom number or diastolic) is not normal and needs evaluation by your doctor   If you smoke, try to reduce how many cigarettes you smoke or quit completely  Do not vape   Other warning signs to watch out for in pregnancy or postpartum: chest pain, obstructed breathing or shortness of breath, seizures, thoughts of hurting yourself or your baby, bleeding, a painful or swollen leg, fever, or headache ([facility] POST-BIRTH Warning Signs campaign)  If these happen call 911  Itching is also not normal in pregnancy and if you experience this, especially over your hands and feet, potentially worse at night, notify your doctors          Nonstress Test for Pregnancy   WHAT YOU NEED TO KNOW:   What do I need to know about a nonstress test?  A nonstress test measures your baby's heart rate and movements  Nonstress means that no stress will be placed on your baby during the test    How do I prepare for a nonstress test?  Your healthcare provider will talk to you about how to prepare for this test  He may tell you to eat and drink plenty of fluids before your test  If you smoke, you may be asked not to smoke within 2 hours before the test  He will also tell you what medicines to take or not take on the day of your test    What will happen during a nonstress test?  You may be asked to lie down or recline back for the test on a bed  One or two belts with sensors will be placed around your abdomen  Your baby's heart rate will be recorded with a machine  If your baby does not move, your baby may be asleep  Your healthcare provider may make a noise near your abdomen to try to wake your baby  The test usually takes about 20 minutes, but can take longer if your baby needs to be awakened  What do I need to know about the test results? Your baby will be expected to move at least twice for a certain amount of time  Your baby's heart rate will be expected to go up by a certain number of beats per minute during movement  If your baby does not move as expected, the test may need to be repeated or you may need other tests  CARE AGREEMENT:   You have the right to help plan your care  Learn about your health condition and how it may be treated  Discuss treatment options with your caregivers to decide what care you want to receive  You always have the right to refuse treatment  The above information is an  only  It is not intended as medical advice for individual conditions or treatments  Talk to your doctor, nurse or pharmacist before following any medical regimen to see if it is safe and effective for you  © 2017 [employer] Information is for End User's use only and may not be sold, redistributed or otherwise used for commercial purposes   All illustrations and images included in [employer] [address] are the copyrighted property of A D A M [employer] Inc  or Uli Espinoza

## 2020-08-28 ENCOUNTER — ROUTINE PRENATAL (OUTPATIENT)
Dept: PERINATAL CARE | Facility: CLINIC | Age: 25
End: 2020-08-28

## 2020-08-28 ENCOUNTER — ULTRASOUND (OUTPATIENT)
Dept: PERINATAL CARE | Facility: CLINIC | Age: 25
End: 2020-08-28
Payer: COMMERCIAL

## 2020-08-28 VITALS
HEART RATE: 78 BPM | BODY MASS INDEX: 27.29 KG/M2 | TEMPERATURE: 96.6 F | SYSTOLIC BLOOD PRESSURE: 106 MMHG | DIASTOLIC BLOOD PRESSURE: 68 MMHG | WEIGHT: 154 LBS | HEIGHT: 63 IN

## 2020-08-28 DIAGNOSIS — Z3A.34 34 WEEKS GESTATION OF PREGNANCY: ICD-10-CM

## 2020-08-28 DIAGNOSIS — IMO0002 EVALUATE ANATOMY NOT SEEN ON PRIOR SONOGRAM: ICD-10-CM

## 2020-08-28 DIAGNOSIS — Z36.89 ENCOUNTER FOR ULTRASOUND TO CHECK FETAL GROWTH: ICD-10-CM

## 2020-08-28 DIAGNOSIS — Z3A.34 34 WEEKS GESTATION OF PREGNANCY: Primary | ICD-10-CM

## 2020-08-28 DIAGNOSIS — O30.043 DICHORIONIC DIAMNIOTIC TWIN PREGNANCY IN THIRD TRIMESTER: Primary | ICD-10-CM

## 2020-08-28 PROCEDURE — 59025 FETAL NON-STRESS TEST: CPT | Performed by: OBSTETRICS & GYNECOLOGY

## 2020-08-28 PROCEDURE — 76816 OB US FOLLOW-UP PER FETUS: CPT | Performed by: OBSTETRICS & GYNECOLOGY

## 2020-08-28 PROCEDURE — 99212 OFFICE O/P EST SF 10 MIN: CPT | Performed by: OBSTETRICS & GYNECOLOGY

## 2020-08-28 PROCEDURE — NC001 PR NO CHARGE

## 2020-08-28 NOTE — LETTER
August 28, 2020     Thu Trammell MD  [address]    Patient: Jose Dutta   YOB: 1995   Date of Visit: 8/28/2020       Dear Dr Hien Child:    Thank you for referring Jose Dutta to me for evaluation  Below are my notes for this consultation  If you have questions, please do not hesitate to call me  I look forward to following your patient along with you  Sincerely,        Heather Carroll MD        CC: No Recipients  Heather Carroll MD  8/28/2020 10:03 AM  Sign when Signing Visit  [facility]: Ms Laurie Hernandez was seen today at 34w2d for NST (found under the pregnancy episode) which I reviewed the RN assessment and agree, and fetal growth ultrasound (see ultrasound report under OB procedures tab)  Please don't hesitate to contact our office with any concerns or questions    Heather Carroll MD

## 2020-08-28 NOTE — PROGRESS NOTES
Sundeep Craft: Ms Clarisse Clark was seen today at 34w2d for NST (found under the pregnancy episode) which I reviewed the RN assessment and agree, and fetal growth ultrasound (see ultrasound report under OB procedures tab)  Please don't hesitate to contact our office with any concerns or questions    Jack Grier MD

## 2020-08-28 NOTE — LETTER
NST sleeve cover sheet    Patient name: Sarah Avery  : 1995  MRN: [id number]    JESSICA: Estimated Date of Delivery: 10/7/20    Obstetrician: [facility]___    Reason(s) for testing:  ___Di/Di Twins______      Testing frequency:    ___ 2x/wk  ___ 1x/wk  ___ Dopplers  ___ BPP?       Last growth scan: __________________________________________

## 2020-09-05 ENCOUNTER — HOSPITAL ENCOUNTER (INPATIENT)
Facility: HOSPITAL | Age: 25
LOS: 2 days | Discharge: HOME | End: 2020-09-07
Attending: OBSTETRICS & GYNECOLOGY | Admitting: OBSTETRICS & GYNECOLOGY
Payer: COMMERCIAL

## 2020-09-05 DIAGNOSIS — Z3A.35 35 WEEKS GESTATION OF PREGNANCY: ICD-10-CM

## 2020-09-05 LAB
ABO GROUP BLD: NORMAL
BASE EXCESS BLDCOA CALC-SCNC: -1.6 MMOL/L (ref 3–11)
BASE EXCESS BLDCOA CALC-SCNC: -2.7 MMOL/L (ref 3–11)
BASE EXCESS BLDCOV CALC-SCNC: -1.6 MMOL/L (ref 1–9)
BASE EXCESS BLDCOV CALC-SCNC: -3.3 MMOL/L (ref 1–9)
BLD GP AB SCN SERPL QL: NEGATIVE
ERYTHROCYTE [DISTWIDTH] IN BLOOD BY AUTOMATED COUNT: 18.5 % (ref 11.6–15.1)
HCO3 BLDCOA-SCNC: 24.8 MMOL/L (ref 17.3–27.3)
HCO3 BLDCOA-SCNC: 26.9 MMOL/L (ref 17.3–27.3)
HCO3 BLDCOV-SCNC: 21.1 MMOL/L (ref 12.2–28.6)
HCO3 BLDCOV-SCNC: 23 MMOL/L (ref 12.2–28.6)
HCT VFR BLD AUTO: 38.4 % (ref 34.8–46.1)
HGB BLD-MCNC: 12.2 G/DL (ref 11.5–15.4)
MCH RBC QN AUTO: 27.8 PG (ref 26.8–34.3)
MCHC RBC AUTO-ENTMCNC: 31.8 G/DL (ref 31.4–37.4)
MCV RBC AUTO: 88 FL (ref 82–98)
O2 CT VFR BLDCOA CALC: 11.1 ML/DL
O2 CT VFR BLDCOA CALC: 12.8 ML/DL
OXYHGB MFR BLDCOA: 46 %
OXYHGB MFR BLDCOA: 55.6 %
OXYHGB MFR BLDCOV: 75.5 %
OXYHGB MFR BLDCOV: 86.3 %
PCO2 BLDCOA: 52.8 MM[HG] (ref 30–60)
PCO2 BLDCOA: 59.6 MM[HG] (ref 30–60)
PCO2 BLDCOV: 36.3 MM HG (ref 27–43)
PCO2 BLDCOV: 39 MM HG (ref 27–43)
PH BLDCOA: 7.27 [PH] (ref 7.23–7.43)
PH BLDCOA: 7.29 [PH] (ref 7.23–7.43)
PH BLDCOV: 7.38 [PH] (ref 7.19–7.49)
PH BLDCOV: 7.39 [PH] (ref 7.19–7.49)
PLATELET # BLD AUTO: 167 THOUSANDS/UL (ref 149–390)
PMV BLD AUTO: 9 FL (ref 8.9–12.7)
PO2 BLDCOA: 21.4 MM HG (ref 5–25)
PO2 BLDCOA: 24.8 MM HG (ref 5–25)
PO2 BLDCOV: 29.8 MM HG (ref 15–45)
PO2 BLDCOV: 41.1 MM HG (ref 15–45)
RBC # BLD AUTO: 4.39 MILLION/UL (ref 3.81–5.12)
RH BLD: POSITIVE
SAO2 % BLDCOV: 16.6 ML/DL
SAO2 % BLDCOV: 19.1 ML/DL
SPECIMEN EXPIRATION DATE: NORMAL
WBC # BLD AUTO: 11.53 THOUSAND/UL (ref 4.31–10.16)

## 2020-09-05 PROCEDURE — 86850 RBC ANTIBODY SCREEN: CPT | Performed by: OBSTETRICS & GYNECOLOGY

## 2020-09-05 PROCEDURE — 82805 BLOOD GASES W/O2 SATURATION: CPT | Performed by: OBSTETRICS & GYNECOLOGY

## 2020-09-05 PROCEDURE — 85027 COMPLETE CBC AUTOMATED: CPT | Performed by: OBSTETRICS & GYNECOLOGY

## 2020-09-05 PROCEDURE — 10907ZC DRAINAGE OF AMNIOTIC FLUID, THERAPEUTIC FROM PRODUCTS OF CONCEPTION, VIA NATURAL OR ARTIFICIAL OPENING: ICD-10-PCS | Performed by: OBSTETRICS & GYNECOLOGY

## 2020-09-05 PROCEDURE — 86901 BLOOD TYPING SEROLOGIC RH(D): CPT | Performed by: OBSTETRICS & GYNECOLOGY

## 2020-09-05 PROCEDURE — 88307 TISSUE EXAM BY PATHOLOGIST: CPT | Performed by: PATHOLOGY

## 2020-09-05 PROCEDURE — 59409 OBSTETRICAL CARE: CPT | Performed by: OBSTETRICS & GYNECOLOGY

## 2020-09-05 PROCEDURE — 59400 OBSTETRICAL CARE: CPT | Performed by: OBSTETRICS & GYNECOLOGY

## 2020-09-05 PROCEDURE — 86592 SYPHILIS TEST NON-TREP QUAL: CPT | Performed by: OBSTETRICS & GYNECOLOGY

## 2020-09-05 PROCEDURE — 4A1HXCZ MONITORING OF PRODUCTS OF CONCEPTION, CARDIAC RATE, EXTERNAL APPROACH: ICD-10-PCS | Performed by: OBSTETRICS & GYNECOLOGY

## 2020-09-05 PROCEDURE — G0463 HOSPITAL OUTPT CLINIC VISIT: HCPCS

## 2020-09-05 PROCEDURE — 99202 OFFICE O/P NEW SF 15 MIN: CPT

## 2020-09-05 PROCEDURE — 86900 BLOOD TYPING SEROLOGIC ABO: CPT | Performed by: OBSTETRICS & GYNECOLOGY

## 2020-09-05 RX ORDER — OXYTOCIN/RINGER'S LACTATE 30/500 ML
1-30 PLASTIC BAG, INJECTION (ML) INTRAVENOUS
Status: DISCONTINUED | OUTPATIENT
Start: 2020-09-05 | End: 2020-09-05

## 2020-09-05 RX ORDER — DIAPER,BRIEF,INFANT-TODD,DISP
1 EACH MISCELLANEOUS 4 TIMES DAILY PRN
Status: DISCONTINUED | OUTPATIENT
Start: 2020-09-05 | End: 2020-09-07 | Stop reason: HOSPADM

## 2020-09-05 RX ORDER — MAGNESIUM HYDROXIDE/ALUMINUM HYDROXICE/SIMETHICONE 120; 1200; 1200 MG/30ML; MG/30ML; MG/30ML
15 SUSPENSION ORAL EVERY 6 HOURS PRN
Status: DISCONTINUED | OUTPATIENT
Start: 2020-09-05 | End: 2020-09-07 | Stop reason: HOSPADM

## 2020-09-05 RX ORDER — DOCUSATE SODIUM 100 MG/1
100 CAPSULE, LIQUID FILLED ORAL 2 TIMES DAILY
Status: DISCONTINUED | OUTPATIENT
Start: 2020-09-05 | End: 2020-09-07 | Stop reason: HOSPADM

## 2020-09-05 RX ORDER — CALCIUM CARBONATE 200(500)MG
1000 TABLET,CHEWABLE ORAL DAILY PRN
Status: DISCONTINUED | OUTPATIENT
Start: 2020-09-05 | End: 2020-09-07 | Stop reason: HOSPADM

## 2020-09-05 RX ORDER — IBUPROFEN 600 MG/1
600 TABLET ORAL EVERY 6 HOURS PRN
Status: DISCONTINUED | OUTPATIENT
Start: 2020-09-05 | End: 2020-09-07 | Stop reason: HOSPADM

## 2020-09-05 RX ORDER — SENNOSIDES 8.6 MG
1 TABLET ORAL DAILY
Status: DISCONTINUED | OUTPATIENT
Start: 2020-09-05 | End: 2020-09-07 | Stop reason: HOSPADM

## 2020-09-05 RX ORDER — SIMETHICONE 80 MG
80 TABLET,CHEWABLE ORAL 4 TIMES DAILY PRN
Status: DISCONTINUED | OUTPATIENT
Start: 2020-09-05 | End: 2020-09-07 | Stop reason: HOSPADM

## 2020-09-05 RX ORDER — SODIUM CHLORIDE, SODIUM LACTATE, POTASSIUM CHLORIDE, CALCIUM CHLORIDE 600; 310; 30; 20 MG/100ML; MG/100ML; MG/100ML; MG/100ML
125 INJECTION, SOLUTION INTRAVENOUS CONTINUOUS
Status: DISCONTINUED | OUTPATIENT
Start: 2020-09-05 | End: 2020-09-05

## 2020-09-05 RX ORDER — ACETAMINOPHEN 325 MG/1
650 TABLET ORAL EVERY 4 HOURS PRN
Status: DISCONTINUED | OUTPATIENT
Start: 2020-09-05 | End: 2020-09-07 | Stop reason: HOSPADM

## 2020-09-05 RX ADMIN — ACETAMINOPHEN 650 MG: 325 TABLET ORAL at 21:21

## 2020-09-05 RX ADMIN — BENZOCAINE AND LEVOMENTHOL: 200; 5 SPRAY TOPICAL at 12:28

## 2020-09-05 RX ADMIN — WITCH HAZEL 1 PAD: 500 SOLUTION RECTAL; TOPICAL at 12:28

## 2020-09-05 RX ADMIN — IBUPROFEN 600 MG: 600 TABLET ORAL at 12:28

## 2020-09-05 RX ADMIN — SODIUM CHLORIDE, SODIUM LACTATE, POTASSIUM CHLORIDE, AND CALCIUM CHLORIDE 125 ML/HR: .6; .31; .03; .02 INJECTION, SOLUTION INTRAVENOUS at 10:59

## 2020-09-05 RX ADMIN — Medication 250 MILLI-UNITS/MIN: at 11:49

## 2020-09-05 RX ADMIN — IBUPROFEN 600 MG: 600 TABLET ORAL at 18:34

## 2020-09-05 NOTE — DISCHARGE SUMMARY
Discharge Summary - OB/GYN   Kailash Garrido 30 y o  female MRN: [id number]  Unit/Bed#: L&D 325-01 Encounter: [id number]      Admission Date: 2020     Discharge Date: 2020     Admitting Diagnosis:   1  Pregnancy at 35w3d  2  Di-Di twin gestation  3   labor  4  SROM of Baby A  5  Rh +  6  GBS unknown    Discharge Diagnosis:   Same, delivered    Procedures: spontaneous vaginal delivery x2    Delivery Attending: Toni Clemons MD  Discharge Attending: Dr Laurie Hong Course:     Kailash Garrido is a 30 y o  Meme Serve at 35w3d wks who was initially admitted for active labor with di-di twin gestation  Patient was noted to be completely dilated on arrival to triage  She delivered a viable female  on 2020 at [id number]  Weight 4lbs 9oz via spontaneous vaginal delivery  Apgars were 9 (1 min) and 9 (5 min)   was transferred to  nursery  Patient tolerated the procedure well and was transferred to recovery in stable condition  She delivered a viable female  on  at 1138  Weight 4lbs 9 7oz via spontaneous vaginal delivery  Apgars were 8 (1 min) and 8 (5 min)   was transferred to  nursery  Patient tolerated the procedure well and was transferred to recovery in stable condition  Her postpartum course was uncomplicated  Her postpartum pain was well controlled with oral analgesics  On day of discharge, she was ambulating and able to reasonably perform all ADLs  She was voiding and had appropriate bowel function  Pain was well controlled  She was discharged home on post-partum day #2 without complications  Patient was instructed to follow up with her OB as an outpatient and was given appropriate warnings to call provider if she develops signs of infection or uncontrolled pain      Complications: none apparent    Condition at discharge: good     Discharge instructions/Information to patient and family:   - Do not place anything (no partner, tampons or douche) in your vagina for 6 weeks  -You may walk for exercise for the first 6 weeks then gradually return to your usual activities    -Please do not drive for 1 week if you have no stitches and for 2 weeks if you have stitches or underwent a  delivery     -You may take baths or shower per your preference    -Please look at your bust (breasts) in the mirror daily and call for redness or tenderness or increased warmth    -Please call us for temperature > 100 4*F or 38* C, worsening pain or a foul discharge  Discharge Medications:   Prenatal vitamin daily for 6 months or the duration of nursing whichever is longer    Motrin 600 mg orally every 6 hours as needed for pain  Tylenol (over the counter) per bottle directions as needed for pain: do NOT use with percocet  Hydrocortisone cream 1% (over the counter) applied 1-2x daily to hemorrhoids as needed    Planned Readmission: No

## 2020-09-05 NOTE — LACTATION NOTE
Instructions given on pumping at mom's request to increase supply for twins  Discussed when to start, frequency, different pumps available versus manual expression  Discussed hygiene of hands and supplies as well as assembly, placement of flanges, size of flanged, preparing the breast and cycles and suction settings on pump  Demonstrated use of hand pump  Discussed labeling of milk, storage, and preparation of stored milk  Encouraged mom to pump every 3 hours for 15 minutes after feeding twins at the breast     Encouraged parents to call for assistance, questions, and concerns about breastfeeding  Extension provided

## 2020-09-05 NOTE — OB LABOR/OXYTOCIN SAFETY PROGRESS
Labor Progress Note - Amado Victoriaper 30 y o  female MRN: [id number]    Unit/Bed#: L&D 325-01 Encounter: [id number]                 Cervical Dilation: 10  Dilation Complete Date: 09/05/20  Dilation Complete Time: 0900  Cervical Effacement: 100  Fetal Station: 2                      Vital Signs:   Vitals:    09/05/20 0835   BP: 126/58   Pulse: 86   Resp: 14   Temp: 98 2 °F (36 8 °C)   SpO2: 98%           Notes/comments:   Baby A born at 200  Baby B noted to be vertex presentation by ultrasound  Patient requesting to ambulate by labor bed  SVE 8-9/80/-3   Continue expectant management  FHT category I    Charmaine Gutierrez MD 9/5/2020 10:46 AM

## 2020-09-05 NOTE — L&D DELIVERY NOTE
Vaginal Delivery Note - OB/GYN   Hamilton Sepulveda 30 y o  female MRN: [id number]  Unit/Bed#: L&D 325-01 Encounter: [id number]          Predelivery Diagnosis:  1  Pregnancy at 35w3d  2   Labor  3  Di-di twin gestation  4  Spontaneous rupture of membranes of Baby A  5  Vertex presentation of Baby A    Postdelivery Diagnosis:  1  Same as above  2  Delivery of term     Procedure: Spontaneous Vaginal Delivery, spontaneous vaginal delivery    Attending: Vijaya    Assistant: Bernadette Guzmanole    Anesthesia: Epidural    QBL: 62cc  Admission H 2  Admission platelets: 512Y    Complications: none apparent    Specimens: cord blood, arterial and venous cord blood gasses, placenta to pathology    Findings:   BABY A  1  Viable female at 200, with APGARS of 9 and 9 at 1 and 5 minutes respectively,  2  Spontaneous delivery of intact placentas at 1150  3  Small periurethral laceration, hemostatic without repair  4  Blood gases:   Arterial pH: 7 289   Arterial base excess: -2 7   Venous pH: 7 383   Venous base excess: -3 3    BABY B  1  Viable female at 1138, with APGARS of 8 and 8 at 1 and 5 minutes respectively  2  Blood gases:   Arterial pH: 7 272   Arterial base excess: -1 6   Venous pH: 7 389   Venous base excess: -1 6    Disposition:  Patient tolerated the procedure well and was recovering in labor and delivery room     Brief history and labor course:  Ms Hamilton Sepulveda is a 30 y o   at 35wk3d  She presented to labor and delivery for SROM  Her pregnancy was complicated di-di twin gestation and GBS unknown status  On exam in triage she was noted to be 10/100/+2  She was admitted for active labor  Description of procedure    After pushing for 24 minutes, at 0934 patient delivered a viable female , wt pending, apgars of 9 (1 min) and 9 (5 min)  The fetal vertex delivered spontaneously  Baby was checked for nuchal  No nuchal cord was palpated   The anterior shoulder delivered atraumatically with maternal expulsive forces and the assistance of gentle downward traction  The posterior shoulder delivered with maternal expulsive forces and the assistance of upward traction  The remainder of the fetus delivered spontaneously  Upon delivery, the infant was placed on the mothers abdomen and the cord was clamped and cut  Delayed cord clamping was performed  The infant was noted to cry spontaneously and was moving all extremities appropriately  There was no evidence for injury  Awaiting nurse resuscitators evaluated the   Arterial and venous cord blood gases and cord blood was collected for analysis  Following delivery of baby a, ultrasound was used to verify presentation of baby B  Vertex presentation was confirmed  Fetal station was noted to be ballotable at this time  Patient was allowed to labor and ambulate for some time  At 11:27 a m , AROM was performed for clear fluid  Patient quickly became completely dilated again and began to push  After pushing for 8 minutes, at 1138 patient delivered a viable female , wt pending, apgars of 8 (1 min) and 8 (5 min)  The fetal vertex delivered spontaneously  Baby was checked for nuchal  No nuchal cord was palpated but a body cord was noted after delivery  The anterior shoulder delivered atraumatically with maternal expulsive forces and the assistance of gentle downward traction  The posterior shoulder delivered with maternal expulsive forces and the assistance of upward traction  The remainder of the fetus delivered spontaneously  Upon delivery, the infant was placed on the mothers abdomen and the cord was clamped and cut  Delayed cord clamping was perfomed  The infant was noted to cry spontaneously and was moving all extremities appropriately  There was no evidence for injury  Awaiting nurse resuscitators evaluated the   Arterial and venous cord blood gases and cord blood was collected for analysis   These were promptly sent to the lab  In the immediate post-partum, 30 units of IV pitocin was administered, and the uterus was noted to contract down well with massage and pitocin  The placenta delivered spontaneously at 1150 and was noted to have a centrally inserted 3 vessel cord  The vagina, cervix, perineum, and rectum were inspected and there was noted to be a small periurethral laceration that was hemostatic without repair  At the conclusion of the procedure, all needle, sponge, and instrument counts were noted to be correct  Patient tolerated the procedure well and was allowed to recover in labor and delivery room with family and  before being transferred to the post-partum floor  The attending was present and participated in all key portions of the case          Kathleen Tanner MD  2020  12:04 PM

## 2020-09-05 NOTE — LACTATION NOTE
This note was copied from a baby's chart  Met with mother  Provided mother with Ready, Set, Baby booklet  Discussed Skin to Skin contact an benefits to mom and baby  Talked about the delay of the first bath until baby has adjusted  Spoke about the benefits of rooming in  Feeding on cue and what that means for recognizing infant's hunger  Avoidance of pacifiers for the first month discussed  Talked about exclusive breastfeeding for the first 6 months  Positioning and latch reviewed as well as showing images of other feeding positions  Discussed the properties of a good latch in any position  Reviewed hand/manual expression  Discussed s/s that baby is getting enough milk and some s/s that breastfeeding dyad may need further help  Gave information on common concerns, what to expect the first few weeks after delivery, preparing for other caregivers, and how partners can help  Resources for support also provided  Twins packet and feeding LPI hand out given and reviewed  Assisted mom to attempt to latch baby, baby sleepy and not interested  Assisted mom to hand express 5ml colostrum and fed to baby via cup, tolerated well  Encouraged parents to call for assistance, questions, and concerns about breastfeeding  Extension provided

## 2020-09-05 NOTE — LACTATION NOTE
This note was copied from a baby's chart  Met with mother  Provided mother with Ready, Set, Baby booklet  Discussed Skin to Skin contact an benefits to mom and baby  Talked about the delay of the first bath until baby has adjusted  Spoke about the benefits of rooming in  Feeding on cue and what that means for recognizing infant's hunger  Avoidance of pacifiers for the first month discussed  Talked about exclusive breastfeeding for the first 6 months  Positioning and latch reviewed as well as showing images of other feeding positions  Discussed the properties of a good latch in any position  Reviewed hand/manual expression  Discussed s/s that baby is getting enough milk and some s/s that breastfeeding dyad may need further help  Gave information on common concerns, what to expect the first few weeks after delivery, preparing for other caregivers, and how partners can help  Resources for support also provided  Twins packet and feeding LPI handout given and reviewed  Encouraged parents to call for assistance, questions, and concerns about breastfeeding  Extension provided

## 2020-09-05 NOTE — H&P
OB H&P  Catrachita Floyd  1995  L&D 325/L&D 325-01  Patient of Dr Hauser Och    30 y o  yo  at 35w3d weeks by LMP  Clovis Twins in active labor  Birth plan for no analgesia, additional preferences on  care pending    Chief complaint:  Labor pain    HPI:  Contractions:  yes  Fetal movement:  yes  Vaginal bleeding:   no  Leaking of fluid:  yes      Pregnancy Complications:  Patient Active Problem List   Diagnosis    35 weeks gestation of pregnancy        Dichorionic diamniotic twin pregnancy in third trimester    Hyperemesis gravidarum    Refuses tetanus, diphtheria, and acellular pertussis (Tdap) vaccination         Past Medical History:  Past Medical History:   Diagnosis Date    Depression     no medications    Urinary tract infection     Varicella      Past Surgical History:  No past surgical history on file  Social Hx:  No tobacco or drug use  Occasional alcohol use prior to pregnancy but not during pregnancy    Meds:  No current facility-administered medications on file prior to encounter  Current Outpatient Medications on File Prior to Encounter   Medication Sig Dispense Refill    calcium gluconate 500 mg tablet Take 1,500 mg by mouth daily Can take 1 tab with each meal      Doxylamine-Pyridoxine (Diclegis) 10-10 MG TBEC Start with 2tabs QHS If no improvement in 48 hours, may increase to include 1 tablet QAM and 1 tablet QPM to max  Of 4 tabs/24hours 120 tablet 2    ferrous sulfate 325 (65 Fe) mg tablet Take 325 mg by mouth daily Prior to bedtime on an empty stomach  Do not take with calcium      glucose blood (FREESTYLE TEST STRIPS) test strip Use as instructed (Patient not taking: Reported on 2020) 50 each 0    glucose monitoring kit (FREESTYLE) monitoring kit Check glucose fasting and 2 hours after each meal for 1 week  Record all values and bring to your next OB appointment   (Patient not taking: Reported on 2020) 1 each 0    Lancets (FREESTYLE) lancets Use as instructed (Patient not taking: Reported on 2020) 50 each 0    ondansetron (ZOFRAN) 4 mg tablet Take 1 tablet (4 mg total) by mouth every 8 (eight) hours as needed for nausea or vomiting 90 tablet 2    Prenatal Vit-Fe Fumarate-FA (PRENATAL 19 PO) Take 1 tablet by mouth daily         Allergies:  No Known Allergies    Obstetric History:    Prior  at 38 weeks 6lb 11 oz    Labs:  No results found for: STREPGRPB  Type & Screen:  ABO Grouping   Date Value Ref Range Status   2020 O  Final      Rh Factor   Date Value Ref Range Status   2020 Positive  Final    No results found for: ANTIBODYSCR    Specimen Expiration Date   Date Value Ref Range Status   2020 [id number]  Final     HIV-1/HIV-2 Ab   Date Value Ref Range Status   2020 Non-Reactive Non-Reactive Final     Hepatitis B Surface Ag   Date Value Ref Range Status   2020 Non-reactive Non-reactive, NonReactive - Confirmed Final     RPR   Date Value Ref Range Status   2020 Non-Reactive Non-Reactive Final     Rubella IgG Quant   Date Value Ref Range Status   2020 19 8 >9 9 IU/mL Final     No results found for: [id number]    Varicella listed in medical history        Physical Exam:  Vital signs: Blood pressure 126/58, pulse 86, temperature 98 2 °F (36 8 °C), resp  rate 14, last menstrual period 2020, SpO2 98 %  Weight:  154 lb  Abdomen: Gravid    Estimated Fetal Weight: A: 4lb 13oz; B: 4lb 7oz  Extremeties: 2+ pedal edema  Presentation: A: VTX; B: Transverse, head to maternal left  Cervix: 10 cm  FHR: A: 120's B: 150-160's on ultrasound and Doppler; maternal pulse 86  CTXN: every 3 minutes subjectively  Membranes: Baby A: ruptured @ 2300 on 2020    Assessment/Plan:    at 35w 3d     Spontaneous  labor  Vertex presentation of baby A  Declines analgesia  Anticipate   Notify Dr Rome Luke (en route)  Move to OR for delivery  Notify anesthesia for intrapartum support  Initial management with Dr Montrell Salazar as Dr Salinas Conde was en route      Aimee Ramos MD, PGY-3  9/5/2020  10:26 AM

## 2020-09-05 NOTE — PROGRESS NOTES
Upon admission I reviewed and educated patient &  regarding the baby consent forms (general consent, privacy practice acknowledgement,  photo acknowledgement and mother/baby safety pledge)  Both had stated that they did not want to sign at that time and requested signing after delivery when they could review the forms closer  I also spoke with Dr Nuvia Carrera about reviewing the refusal to treatment form since they did not want the babies to have erythromycin, vitamin k or the hepatitis b vaccine which they declined to sign as well  I have asked multiple times for the forms to be reviewed since then and they stated that they would get to it eventually  Dr Nuvia Carrera made aware of refusal to sign forms at this time  Will continue to check in with patient and

## 2020-09-05 NOTE — LACTATION NOTE
This note was copied from a baby's chart  Assisted parents to wake baby  Baby uninterested in feeding/latching  H E  2 5ml colostrum fed to baby via cup  8ml DM fed to baby via bottle  Baby's face felt cold during feeding, Cata Meier RN made aware  Encouraged parents to call for assistance, questions, and concerns about breastfeeding  Extension provided

## 2020-09-06 PROCEDURE — 99024 POSTOP FOLLOW-UP VISIT: CPT | Performed by: OBSTETRICS & GYNECOLOGY

## 2020-09-06 RX ADMIN — ACETAMINOPHEN 650 MG: 325 TABLET ORAL at 18:48

## 2020-09-06 RX ADMIN — IBUPROFEN 600 MG: 600 TABLET ORAL at 07:38

## 2020-09-06 RX ADMIN — ACETAMINOPHEN 650 MG: 325 TABLET ORAL at 04:17

## 2020-09-06 RX ADMIN — ACETAMINOPHEN 650 MG: 325 TABLET ORAL at 11:37

## 2020-09-06 RX ADMIN — IBUPROFEN 600 MG: 600 TABLET ORAL at 00:31

## 2020-09-06 RX ADMIN — IBUPROFEN 600 MG: 600 TABLET ORAL at 14:12

## 2020-09-06 RX ADMIN — IBUPROFEN 600 MG: 600 TABLET ORAL at 20:40

## 2020-09-06 NOTE — LACTATION NOTE
This note was copied from a baby's chart  Met with mom with question about using a nipple shield for inverted right nipple  Mom reports babies are having difficulty latching on the right nipple which is flat and inverts further when compressed  Baby 2 is having difficulty with latch on both breasts, baby 1 is able to latch deeply on left but has difficulty on right  Nipple shield provided with instruction on use and cleansing  Demo of application provided  Babies are being supplemented with DM via bottle as ordered with each feed  Encouraged mom to pump every 3 hours for 15-20 minutes to facilitate increased supply for twins  Encouraged parents to call for assistance, questions, and concerns about breastfeeding  Extension provided

## 2020-09-06 NOTE — LACTATION NOTE
This note was copied from a baby's chart  Assisted mom to latch babies at same time  Baby 1 on right breast and baby 2 on left breast in football hold  Babies able to achieve signs of deep latch and mom express comfort with latch and very happy to be feeding both at the same time  Baby 2 has more difficulty lacthing to the right breast d/t inverted nipple  Encouraged parents to call for assistance, questions, and concerns about breastfeeding  Extension provided

## 2020-09-06 NOTE — PROGRESS NOTES
Progress Note - OB/GYN   Darren Charter 30 y o  female MRN: [id number]  Unit/Bed#: L&D 311-01 Encounter: [id number]    Assessment:  Post partum Day # 1 s/p /, stable, babies in room    Plan:  1)  GBS inadequately treated   - Neonates will require 48h observation  2) Continue routine post partum care   Encourage ambulation   Encourage breastfeeding   Anticipate discharge 2020     Subjective/Objective   Chief Complaint:     Post delivery  Patient is doing well  Lochia WNL  Pain well controlled  Subjective:     Pain: yes, cramping, improved with meds  Tolerating PO: yes  Voiding: yes  Flatus: yes  BM: no  Ambulating: yes  Breastfeeding:  yes  Chest pain: no  Shortness of breath: no  Leg pain: no  Lochia: minimal    Objective:     Vitals: /84 (BP Location: Right arm)   Pulse 84   Temp 98 4 °F (36 9 °C) (Oral)   Resp 18   LMP 2020 (Exact Date)   SpO2 98%   Breastfeeding Yes       Intake/Output Summary (Last 24 hours) at 2020 0723  Last data filed at 2020 2201  Gross per 24 hour   Intake --   Output 962 ml   Net -962 ml       Lab Results   Component Value Date    WBC 11 53 (H) 2020    HGB 12 2 2020    HCT 38 4 2020    MCV 88 2020     2020       Physical Exam:     Gen: AAOx3, NAD  CV: RRR  Lungs: CTA b/l  Abd: Soft, non-tender, non-distended, no rebound or guarding  Uterine fundus firm and non-tender, -2 cm below the umbilicus     Ext: Non tender, Negative Agustin's sign bilaterally    Miya Romeo MD  2020  7:23 AM

## 2020-09-07 VITALS
HEART RATE: 67 BPM | SYSTOLIC BLOOD PRESSURE: 118 MMHG | OXYGEN SATURATION: 98 % | TEMPERATURE: 97.8 F | RESPIRATION RATE: 16 BRPM | DIASTOLIC BLOOD PRESSURE: 83 MMHG

## 2020-09-07 PROCEDURE — 99024 POSTOP FOLLOW-UP VISIT: CPT | Performed by: OBSTETRICS & GYNECOLOGY

## 2020-09-07 RX ORDER — IBUPROFEN 200 MG
600 TABLET ORAL EVERY 6 HOURS PRN
Start: 2020-09-07 | End: 2020-10-16 | Stop reason: ALTCHOICE

## 2020-09-07 RX ORDER — PHENYLEPHRINE HCL IN 0.9% NACL 1 MG/10 ML
SYRINGE (ML) INTRAVENOUS
Status: DISCONTINUED
Start: 2020-09-07 | End: 2020-09-07 | Stop reason: HOSPADM

## 2020-09-07 RX ORDER — ACETAMINOPHEN 325 MG/1
650 TABLET ORAL EVERY 4 HOURS PRN
Qty: 30 TABLET | Refills: 0
Start: 2020-09-07 | End: 2020-10-16 | Stop reason: ALTCHOICE

## 2020-09-07 RX ADMIN — IBUPROFEN 600 MG: 600 TABLET ORAL at 09:52

## 2020-09-07 RX ADMIN — ACETAMINOPHEN 650 MG: 325 TABLET ORAL at 15:05

## 2020-09-07 RX ADMIN — ACETAMINOPHEN 650 MG: 325 TABLET ORAL at 07:58

## 2020-09-07 RX ADMIN — IBUPROFEN 600 MG: 600 TABLET ORAL at 03:23

## 2020-09-07 NOTE — PLAN OF CARE
Problem: POSTPARTUM  Goal: Experiences normal postpartum course  Description: INTERVENTIONS:  - Monitor maternal vital signs  - Assess uterine involution and lochia  Outcome: Adequate for Discharge  Goal: Appropriate maternal -  bonding  Description: INTERVENTIONS:  - Identify family support  - Assess for appropriate maternal/infant bonding   -Encourage maternal/infant bonding opportunities  - Referral to  or  as needed  Outcome: Adequate for Discharge  Goal: Establishment of infant feeding pattern  Description: INTERVENTIONS:  - Assess breast/bottle feeding  - Refer to lactation as needed  Outcome: Adequate for Discharge  Goal: Incision(s), wounds(s) or drain site(s) healing without S/S of infection  Description: INTERVENTIONS  - Assess and document risk factors for skin impairment   - Assess and document dressing, incision, wound bed, drain sites and surrounding tissue  - Consider nutrition services referral as needed  - Oral mucous membranes remain intact  - Provide patient/ family education  Outcome: Adequate for Discharge

## 2020-09-07 NOTE — PLAN OF CARE
Problem: POSTPARTUM  Goal: Experiences normal postpartum course  Description: INTERVENTIONS:  - Monitor maternal vital signs  - Assess uterine involution and lochia  2020 by Janine Dey RN  Outcome: Completed  2020 by Janine Dey RN  Outcome: Adequate for Discharge  Goal: Appropriate maternal -  bonding  Description: INTERVENTIONS:  - Identify family support  - Assess for appropriate maternal/infant bonding   -Encourage maternal/infant bonding opportunities  - Referral to  or  as needed  2020 by Janine Dey RN  Outcome: Completed  2020 by Janine Dey RN  Outcome: Adequate for Discharge  Goal: Establishment of infant feeding pattern  Description: INTERVENTIONS:  - Assess breast/bottle feeding  - Refer to lactation as needed  2020 by Janine Dey RN  Outcome: Completed  2020 by Janine Dey RN  Outcome: Adequate for Discharge  Goal: Incision(s), wounds(s) or drain site(s) healing without S/S of infection  Description: INTERVENTIONS  - Assess and document risk factors for skin impairment   - Assess and document dressing, incision, wound bed, drain sites and surrounding tissue  - Consider nutrition services referral as needed  - Oral mucous membranes remain intact  - Provide patient/ family education  2020 by Janine Dey RN  Outcome: Completed  2020 by Janine Dey RN  Outcome: Adequate for Discharge

## 2020-09-07 NOTE — NURSING NOTE
Discharge teaching for mom and babies completed with FOB present  Both parents verbalized understanding and were instructed to contact OB with any further questions

## 2020-09-07 NOTE — DISCHARGE INSTRUCTIONS
Self Care After Delivery   AMBULATORY CARE:   The postpartum period  is the period of time from delivery to about 6 weeks  During this time you may experience many physical and emotional changes  It is important to understand what is normal and when you need to call your healthcare provider  It is also important to know how to care for yourself during this time  Call 911 for any of the following:   · You soak through 1 pad in 15 minutes, have blurry vision, clammy or pale skin, and feel faint  · You faint or lose consciousness  · Your heart is beating faster than normal      · You have trouble breathing  · You cough up blood  Seek care immediately if:   · You soak through 1 or more pads in an hour, or pass blood clots larger than a quarter from your vagina  · Your leg is painful, red, and larger than normal      · You have severe abdominal pain  · You have a bad headache or changes in your vision  · Your episiotomy or C section incision is red, swollen, bleeding, or draining pus  · Your incision comes apart  · You see or hear things that are not there, or have thoughts of harming yourself or your baby  Contact your obstetrician or midwife if:   · You have a fever  · You have new or worsening pain in your abdomen or vagina  · You continue to have the baby blues 10 days after you deliver  · You have trouble sleeping  · You have foul-smelling discharge from your vagina  · You have pain or burning when you urinate  · You do not have a bowel movement for 3 days or more  · You have nausea or are vomiting  · You have hard lumps or red streaks over your breasts  · You have cracked nipples or bleed from your nipples  · You have questions or concerns about your condition or care  Physical changes:   The following are normal changes after you give birth:  · Pain in the area between your anus and vagina    · Breast pain    · Constipation or hemorrhoids    · Hot or cold flashes    · Vaginal bleeding or discharge    · Mild to moderate abdominal cramping    · Difficulty controlling bowel movements or urine  Emotional changes: The following are symptoms of the baby blues, or normal emotions after you give birth  The changes in your emotions may be caused by a drop in hormone levels after you deliver  If these symptoms last longer than 1 to 2 weeks after you give birth, you may have postpartum depression  · Feeling irritable    · Feeling sad    · Crying for no reason    · Feeling anxious  Breast care for nursing mothers: You may have sore breasts for 3 to 6 days after you give birth  This happens as your milk begins to fill your breasts  You may also have sore breasts if you do not breastfeed frequently  Do the following to care for your breasts:  · Apply a moist, warm, compress to your breast as directed  This may help soothe your breasts  Make sure the washcloth is not too hot before you apply it to your breast      · Nurse your baby or pump your milk frequently  This may prevent clogged milk ducts  Ask your healthcare provider how often to nurse or pump  · Massage your breasts as directed  This may help increase your milk flow  Gently rub your breasts in a circular motion before you breastfeed  You may need to gently squeeze your breast or nipple to help release milk  You can also use a breast pump to help release milk from your breast      · Wash your breasts with warm water only  Do not put soap on your nipples  Soap may cause your nipples to become dry  · Apply lanolin cream to your nipples as directed  Lanolin cream may add moisture to your skin and prevent nipple dryness  Always  wash off lanolin cream with warm water before you breastfeed  · Place pads in your bra  Your nipples may leak milk when you are not breastfeeding  You can place pads inside of your bra to help prevent leaking onto your clothing   Ask your healthcare provider where to purchase bra pads  · Get breastfeeding support if needed  There are healthcare providers who can answer questions about breastfeeding and provide you with support  Ask your healthcare provider who you can contact if you need breastfeeding support  Breast care for non-nursing mothers:  Milk will fill your breasts even if you bottle feed your baby  Do the following to help stop your milk from filling your breasts and causing pain:  · Wear a bra with support at all times  A sports bra or a tight-fitting bra will help stop your milk from coming in  · Apply ice on each breast for 15 to 20 minutes every hour or as directed  Use an ice pack, or put crushed ice in a plastic bag  Cover it with a towel  Ice helps your milk ducts shrink  · Keep your breasts away from warm water  Warm water will make it easier for milk to fill your breasts  Stand with your breasts away from warm water in the shower  · Limit how much you touch your breasts  This will prevent them from filling with milk  Perineum care: Your perineum is the area between your rectum and vagina  It is normal to have swelling and pain in this area after you give birth  If you had an episiotomy, your healthcare provider may give you special instructions  · Clean your perineum after you use the bathroom  This may prevent infection and help with healing  Use a spray bottle with warm water to clean your perineum  You may also gently spray warm water against your perineum when you urinate  Always wipe front to back  · Take a sitz bath as directed  A sitz bath may help relieve swelling and pain  Fill your bath tub or bucket with water up to your hips and sit in the water  Use cold water for 2 days after you deliver  Then use warm water  Ask your healthcare provider for more information about a sitz bath  · Apply ice packs for the first 24 hours or as directed  Use a plastic glove filled with ice or buy an ice pack   Wrap the ice pack or plastic glove in a small towel or wash cloth  Place the ice pack on your perineum for 20 minutes at a time  · Sit on a donut-shaped pillow  This may relieve pressure on your perineum when you sit  · Use wipes with medicine or take pills as directed  Your healthcare provider may tell you to use witch hazel pads  You can place witch hazel pads in the refrigerator before you apply them to your perineum  He may also tell you to take NSAIDs  Ask your healthcare provider how often to take pills or use wipes with medicine  · Do not go swimming or take tub baths for 4 to 6 weeks or as directed  This will help prevent an infection in your vagina or uterus  Bowel and bladder care: It may take 3 to 5 days to have a bowel movement after you deliver your baby  You can do the following to prevent or manage constipation, and get control of your bowel or bladder:  · Take stool softeners as directed  A stool softener is medicine that will make your bowel movements softer  This may prevent or relieve constipation  A stool softener may also make bowel movements less painful  · Drink plenty of liquids  Ask how much liquid to drink each day and which liquids are best for you  Liquids may help prevent constipation  · Eat foods high in fiber  Examples include fruits, vegetables, grains, beans, and lentils  Ask your healthcare provider how much fiber you need each day  Fiber may prevent constipation  · Do Kegel exercises as directed  Kegel exercises will help strengthen the muscles that control bowel movements and urination  Ask your healthcare provider for more information on Kegel exercises  · Apply cold compresses or medicine to hemorrhoids as directed  This may relieve swelling and pain  Your healthcare provider may tell you to apply ice or wipes with medicine to your hemorrhoids  He may also tell you to use a sitz bath   Ask your healthcare for more information on how to manage hemorrhoids  Nutrition:  Good nutrition is important in the postpartum period  It will help you return to a healthy weight, increase your energy levels, and prevent constipation  It will also help you get enough nutrients and calories if you are going to breastfeed your baby  · Eat a variety of healthy foods  Healthy foods include fruits, vegetables, whole-grain breads, low-fat dairy products, beans, lean meats, and fish  You may need 500 to 700 additional calories each day if you breastfeed your baby  You may also need extra protein  · Limit foods with added sugar and high amounts of fat  These foods are high in calories and low in healthy nutrients  Read food labels so you know how much sugar and fat is in the food you want to eat  · Drink 8 to 10 glasses of water per day  Water will help you make plenty of milk for your baby  It will also help prevent constipation  Drink a glass of water every time you breastfeed your baby  · Take vitamins as directed  Ask your healthcare provider what vitamins you need  · Limit caffeine and alcohol if you are breastfeeding  Caffeine and alcohol can get into your breast milk  Caffeine and alcohol can make your baby fussy  They can also interfere with your baby's sleep  Ask your healthcare provider if you can drink alcohol or caffeine  Rest and sleep: You may feel very tired in the postpartum period  Enough sleep will help you heal and give you energy to care for your baby  The following may help you get sleep and rest:  · Nap when your baby naps  Your baby may nap several times during the day  Get rest during this time  · Limit visitors  Many people may want to see you and your baby  Ask friends or family to visit on different days  This will give you time to rest      · Do not plan too much for one day  Put off household chores so that you have time to rest  Gradually do more each day  · Ask for help from family, friends, or neighbors    Ask them to help you with laundry, cleaning, or errands  Also ask someone to watch the baby while you take a nap or relax  Ask your partner to help with the care of your baby  Pump some of your breast milk so your partner can feed your baby during the night  Exercise after delivery:  Wait until your healthcare provider says it is okay to exercise  Exercise can help you lose weight, increase your energy levels, and manage your mood  It can also prevent constipation and blood clots  Start with gentle exercises such as walking  Do more as you have more energy  You may need to avoid abdominal exercises for 1 to 2 weeks after you deliver  Talk to your healthcare provider about an exercise plan that is right for you  Sexual activity after delivery:   · Do not have sex until your healthcare provider says it is okay  You may need to wait 4 to 6 weeks before you have sex  This may prevent infection and allow time to heal      · Your menstrual cycle may begin as soon as 3 weeks after you deliver  Your period may be delayed if you breastfeed your baby  You can become pregnant before you get your first postpartum period  Talk to your healthcare provider about birth control that is right for you  Some types of birth control are not safe during breastfeeding  For support and more information:  Join a support group for new mothers  Ask for help from family and friends with chores, errands, and care of your baby  · Office of Women's Health, [place] Department of Health and Human Services  [address], [id number] [place] [address]  [address], [address] [place]  [place] , [address]  Phone: 6- 058 - [phone]  Web Address: www womenshealth gov  · [facility] , [address] [place] [address]  [address]  Web Address: [employer]  org/pregnancy/postpartum-care  aspx  Follow up with your obstetrician or midwife as directed:   You will need to follow up with your healthcare provider in 2 to 6 weeks after delivery  Write down your questions so you remember to ask them at your visits  ©  [address]  Information is for End User's use only and may not be sold, redistributed or otherwise used for commercial purposes  All illustrations and images included in CareNotes® are the copyrighted property of A D A M , Inc  or Uli Espinoza  The above information is an  only  It is not intended as medical advice for individual conditions or treatments  Talk to your doctor, nurse or pharmacist before following any medical regimen to see if it is safe and effective for you  Breastfeeding Twins   WHAT YOU SHOULD KNOW:   Breast milk provides the best nutrition for your twins  Twins are often born early and may have a low birthweight   babies are at higher risk for health problems, such as infections  Breast milk helps protect your babies from infection  It may also decrease your babies' risk for allergies and other medical conditions, such as asthma and diabetes  INSTRUCTIONS:   Follow up with your primary healthcare provider [facility]) as directed:  Write down your questions so you remember to ask them during your visits  How to hold your twins while you breastfeed them: You can breastfeed one baby at a time or both at the same time  Sit in a comfortable chair with arm and back support  A special pillow made for breastfeeding twins can help support your arms and the babies' heads  Ask your partner or another person in the home to help you position your twins  Try different positions to find the ones that work best for you  · Double football hold:  Hold one baby's head in each of your hands  Your babies will be lying down with their bodies under each of your arms  · Cross cradle position:  Support one baby's head in each of your arms   Your babies' heads may rest in the bend of your arm near your elbow  You babies' bodies will be in your lap turned toward your abdomen  · Football and cradle hold combination:  Hold one baby using the football hold and the other baby using the cradle hold  How to breastfeed your twins:  Bring each baby to one of your breasts  Hold each baby's head to help him latch on to your breast  Bring each baby's lower lip to the areola (dark area around the nipple) first  Each baby should have as much of an areola in his mouth as he is able to get  Once each baby is latched on, you should feel as if he will not separate from the breast easily  Allow each baby to breastfeed for as long as he is able  Do not set any time limits for how long each baby should feed  How often you should breastfeed your twins:  Your twins may let you know when they are ready to breastfeed  They may be more awake and may be moving more  They may put their hands up to their mouths  Crying is normally a late sign that your twins are hungry  You should breastfeed your twins between 8 and 12 times each day  This includes waking to breastfeed your twins during the night  Ask your PHP about the best ways to wake your baby  If one or both babies is sleeping and it is time to feed, lightly rub your finger across your baby's lips  How to make enough breast milk to feed your twins: The more often you breastfeed your twins, the more milk you will make  You may need to pump milk from your breasts if your twins do not empty your milk during feedings  You can also pump your breast milk to help increase the amount of milk you make  You can store the pumped breast milk and use it later if you are away from your twins and cannot breastfeed  Pump your breast milk every 3 to 4 hours while you are away from your twins to keep up your milk supply    Self-care:   · Get enough rest   Caring for twin babies can make it hard for you to rest  Ask for help from family and friends so that you can get the rest you need  · Eat healthy foods  A healthy meal plan can help your body make enough breast milk  You need extra calories each day while you are breastfeeding twins  Your PHP may also have you take vitamins, including pregnancy vitamins and vitamin D  Talk with him before you take any vitamins or supplements  Drink plenty of liquids each day  Ask your PHP how much liquid to drink each day and which liquids are best for you  · Manage stress  Increased stress can decrease the amount of breast milk you make  Relaxation can help decrease your stress and help you feel better  Deep breathing, meditating, and listening to music also may help you cope with stress  Ask about other ways to manage stress  · Ask about your medicines  Talk to your PHP before you take any medicines  This includes all prescription and over-the-counter medicines  Some medicines may decrease the amount of breast milk you make  Other medicines may enter your breast milk and affect your baby  · Do not smoke  If you smoke, it is never too late to quit  Smoking can decrease the amount of breast milk you make  Talk with your PHP if you smoke and need help to quit  Contact your PHP if:   · One or both of your twins is not gaining weight or looks as if he is losing weight  · One or both of your twins has fewer than 6 wet diapers each day  · One or both of your twins has fewer than 3 to 4 bowel movements each day  · One or both of your twins has new or increased yellowing of his skin or the whites of his eyes  · You have pain and swelling in one or both of your breasts  · Your nipples look red, dry, cracked, or they have scabs on them  · You see or feel a tender lump in your breast   Return to the emergency department if:   · You feel very depressed  © 2014 [address] is for End User's use only and may not be sold, redistributed or otherwise used for commercial purposes   All illustrations and images included in CareNotes® are the copyrighted property of A D A M , Inc  or Uli Espinoza  The above information is an  only  It is not intended as medical advice for individual conditions or treatments  Talk to your doctor, nurse or pharmacist before following any medical regimen to see if it is safe and effective for you

## 2020-09-07 NOTE — PROGRESS NOTES
Progress Note - OB/GYN   Jimenez Cardinal 30 y o  female MRN: [id number]  Unit/Bed#: L&D 311-01 Encounter: [id number]    Assessment:  Post partum Day # 2 s/p /, stable, babies in room    Plan:  1)  GBS inadequately treated   - Neonates will require 48h observation  2) Continue routine post partum care   Encourage ambulation   Encourage breastfeeding   Anticipate discharge 2020     Subjective/Objective   Chief Complaint:     Post delivery  Patient is doing well  Lochia WNL  Pain well controlled  Some discomfort with breastfeeding, improved with nipple barrier cream    Subjective:     Pain: yes, cramping, improved with meds  Tolerating PO: yes  Voiding: yes  Flatus: yes  BM: no  Ambulating: yes  Breastfeeding:  yes  Chest pain: no  Shortness of breath: no  Leg pain: no  Lochia: minimal    Objective:     Vitals: /82 (BP Location: Left arm)   Pulse 59   Temp 98 4 °F (36 9 °C) (Oral)   Resp 16   LMP 2020 (Exact Date)   SpO2 98%   Breastfeeding Yes     No intake or output data in the 24 hours ending 20 0640    Lab Results   Component Value Date    WBC 11 53 (H) 2020    HGB 12 2 2020    HCT 38 4 2020    MCV 88 2020     2020       Physical Exam:     Gen: AAOx3, NAD  CV: RRR  Lungs: CTA b/l  Abd: Soft, non-tender, non-distended, no rebound or guarding  Uterine fundus firm and non-tender, -2 cm below the umbilicus     Ext: Non tender, Negative Agustin's sign bilaterally    Bear Saha MD  2020  6:40 AM

## 2020-09-07 NOTE — LACTATION NOTE
This note was copied from a baby's chart  Information on hand expression given  Discussed benefits of knowing how to manually express breast including stimulating milk supply, softening nipple for latch and evacuating breast in the event of engorgement  Worked on positioning infant up at chest level and starting to feed infant with nose arriving at the nipple  Then, using areolar compression to achieve a deep latch that is comfortable and exchanges optimum amounts of milk  Twin 2 on right breast using football hold as per mom's request  Baby stimulated to suck but remains sleepy for 5 minutes at breast  Parents using Donor breast milk to supplement twins till her milk comes in  Twin 1 suckles well at breast with stimulation for 10 minutes  Dad supportive at bedside  Reviewed pumping instructions  Mom wanted to start pumping early to stimulate supply for twins  Met with mother to go over discharge breastfeeding booklet including the feeding log  Emphasized 8 or more (12) feedings in a 24 hour period, what to expect for the number of diapers per day of life and the progression of properties of the  stooling pattern  Reviewed breastfeeding and your lifestyle, storage and preparation of breast milk, how to keep you breast pump clean, the employed breastfeeding mother and paced bottle feeding handouts  Booklet included Breastfeeding Resources for after discharge including access to the number for the [address]  Dad supportive at bedside  Encouraged parents to call for assistance, questions, and concerns about breastfeeding  Extension provided

## 2020-09-07 NOTE — LACTATION NOTE
This note was copied from a baby's chart  Donor Milk bottles pulled, identified and labeled for discharge home as charted in flow record for each baby  Nursing staff and parents aware that is last thing prior to dischage and be sent home with ice in plastic bag  C/O sore nipples  Information given about sore nipples and how to correct with positioning techniques  Discussed maneuvers to latch infant on properly to avoid nipple pain and promote healing  Discussed treatments that could be utilized to promote healing  Hydro gel dressings given with instruction and verbalization of understanding of cleaning and duration of use  Encouraged parents to call for assistance, questions, and concerns about breastfeeding  Extension provided

## 2020-09-08 LAB — RPR SER QL: NORMAL

## 2020-10-16 ENCOUNTER — POSTPARTUM VISIT (OUTPATIENT)
Dept: OBGYN CLINIC | Facility: MEDICAL CENTER | Age: 25
End: 2020-10-16

## 2020-10-16 VITALS
SYSTOLIC BLOOD PRESSURE: 100 MMHG | BODY MASS INDEX: 24.1 KG/M2 | WEIGHT: 136 LBS | HEIGHT: 63 IN | DIASTOLIC BLOOD PRESSURE: 70 MMHG

## 2020-10-16 DIAGNOSIS — M25.532 LEFT WRIST PAIN: ICD-10-CM

## 2020-10-16 PROBLEM — Z3A.35 35 WEEKS GESTATION OF PREGNANCY: Status: RESOLVED | Noted: 2020-03-31 | Resolved: 2020-10-16

## 2020-10-16 PROCEDURE — 99024 POSTOP FOLLOW-UP VISIT: CPT | Performed by: OBSTETRICS & GYNECOLOGY

## 2020-10-21 ENCOUNTER — TELEPHONE (OUTPATIENT)
Dept: OBGYN CLINIC | Facility: MEDICAL CENTER | Age: 25
End: 2020-10-21

## 2020-11-02 ENCOUNTER — PROCEDURE VISIT (OUTPATIENT)
Dept: OBGYN CLINIC | Facility: MEDICAL CENTER | Age: 25
End: 2020-11-02
Payer: COMMERCIAL

## 2020-11-02 VITALS
DIASTOLIC BLOOD PRESSURE: 70 MMHG | HEIGHT: 63 IN | SYSTOLIC BLOOD PRESSURE: 110 MMHG | BODY MASS INDEX: 23.92 KG/M2 | WEIGHT: 135 LBS

## 2020-11-02 DIAGNOSIS — Z30.430 ENCOUNTER FOR INSERTION OF PARAGARD IUD: Primary | ICD-10-CM

## 2020-11-02 LAB — SL AMB POCT URINE HCG: NEGATIVE

## 2020-11-02 PROCEDURE — 81025 URINE PREGNANCY TEST: CPT | Performed by: OBSTETRICS & GYNECOLOGY

## 2020-11-02 PROCEDURE — 58300 INSERT INTRAUTERINE DEVICE: CPT | Performed by: OBSTETRICS & GYNECOLOGY

## 2020-11-02 RX ORDER — COPPER 313.4 MG/1
1 INTRAUTERINE DEVICE INTRAUTERINE ONCE
Status: COMPLETED | OUTPATIENT
Start: 2020-11-02 | End: 2020-11-02

## 2020-11-02 RX ADMIN — COPPER 1 INTRA UTERINE DEVICE: 313.4 INTRAUTERINE DEVICE INTRAUTERINE at 16:14

## 2021-12-10 ENCOUNTER — OFFICE VISIT (OUTPATIENT)
Dept: OBGYN CLINIC | Facility: MEDICAL CENTER | Age: 26
End: 2021-12-10
Payer: COMMERCIAL

## 2021-12-10 VITALS — BODY MASS INDEX: 23.38 KG/M2 | WEIGHT: 132 LBS

## 2021-12-10 DIAGNOSIS — R30.9 PAIN WITH URINATION: ICD-10-CM

## 2021-12-10 DIAGNOSIS — R10.2 PELVIC PAIN IN FEMALE: Primary | ICD-10-CM

## 2021-12-10 DIAGNOSIS — Z30.432 ENCOUNTER FOR IUD REMOVAL: ICD-10-CM

## 2021-12-10 PROCEDURE — 58301 REMOVE INTRAUTERINE DEVICE: CPT | Performed by: OBSTETRICS & GYNECOLOGY

## 2021-12-10 PROCEDURE — 87086 URINE CULTURE/COLONY COUNT: CPT | Performed by: OBSTETRICS & GYNECOLOGY

## 2021-12-10 PROCEDURE — 99214 OFFICE O/P EST MOD 30 MIN: CPT | Performed by: OBSTETRICS & GYNECOLOGY

## 2021-12-12 LAB
BACTERIA UR CULT: ABNORMAL
BACTERIA UR CULT: ABNORMAL

## 2021-12-13 ENCOUNTER — TELEPHONE (OUTPATIENT)
Dept: OBGYN CLINIC | Facility: MEDICAL CENTER | Age: 26
End: 2021-12-13

## 2021-12-13 DIAGNOSIS — B96.89 BV (BACTERIAL VAGINOSIS): Primary | ICD-10-CM

## 2021-12-13 DIAGNOSIS — N76.0 BV (BACTERIAL VAGINOSIS): Primary | ICD-10-CM

## 2021-12-13 RX ORDER — METRONIDAZOLE 7.5 MG/G
1 GEL VAGINAL 2 TIMES DAILY
Qty: 70 G | Refills: 0 | Status: SHIPPED | OUTPATIENT
Start: 2021-12-13

## 2021-12-31 DIAGNOSIS — N91.2 AMENORRHEA: Primary | ICD-10-CM

## 2022-01-07 ENCOUNTER — OFFICE VISIT (OUTPATIENT)
Dept: OBGYN CLINIC | Facility: MEDICAL CENTER | Age: 27
End: 2022-01-07
Payer: COMMERCIAL

## 2022-01-07 VITALS
SYSTOLIC BLOOD PRESSURE: 118 MMHG | HEIGHT: 63 IN | DIASTOLIC BLOOD PRESSURE: 80 MMHG | WEIGHT: 131 LBS | BODY MASS INDEX: 23.21 KG/M2

## 2022-01-07 DIAGNOSIS — O02.1 MISSED AB: Primary | ICD-10-CM

## 2022-01-07 PROCEDURE — 99214 OFFICE O/P EST MOD 30 MIN: CPT | Performed by: OBSTETRICS & GYNECOLOGY

## 2022-01-07 NOTE — PATIENT INSTRUCTIONS
Take the morning of the procedure  Doxy, Valium, Cytotec that am  Also take 400 of motrin     Nothing to eat after midnight

## 2022-01-11 RX ORDER — MISOPROSTOL 100 UG/1
100 TABLET ORAL ONCE
Qty: 1 TABLET | Refills: 0 | Status: SHIPPED | OUTPATIENT
Start: 2022-01-11 | End: 2022-01-21 | Stop reason: ALTCHOICE

## 2022-01-11 RX ORDER — DOXYCYCLINE 100 MG/1
100 TABLET ORAL ONCE
Qty: 1 TABLET | Refills: 0 | Status: SHIPPED | OUTPATIENT
Start: 2022-01-11 | End: 2022-01-11

## 2022-01-11 RX ORDER — DIAZEPAM 2 MG/1
2 TABLET ORAL ONCE
Qty: 1 TABLET | Refills: 0 | Status: SHIPPED | OUTPATIENT
Start: 2022-01-11 | End: 2022-01-21 | Stop reason: ALTCHOICE

## 2022-01-11 NOTE — PROGRESS NOTES
IUD was removed on 12/10/21    Pt reports that she started bleeding - 12/17- x 10 days  Then again on 1/6  There was heavy bleeding with clots    UPT +  On sonogram today there is a collapsed sac with debree in   Offered the following options    1   cytotec to pass the products  2  Surgical removal   3    MVA -   She has decided on MVA  Will come and get it when her  is off if it does not pass on its own   Medication sent for pain and relaxation     Consented     Follow up as planned

## 2022-01-21 ENCOUNTER — PROCEDURE VISIT (OUTPATIENT)
Dept: OBGYN CLINIC | Facility: MEDICAL CENTER | Age: 27
End: 2022-01-21
Payer: COMMERCIAL

## 2022-01-21 ENCOUNTER — APPOINTMENT (OUTPATIENT)
Dept: LAB | Facility: MEDICAL CENTER | Age: 27
End: 2022-01-21
Payer: COMMERCIAL

## 2022-01-21 VITALS
WEIGHT: 133 LBS | HEIGHT: 63 IN | BODY MASS INDEX: 23.57 KG/M2 | DIASTOLIC BLOOD PRESSURE: 70 MMHG | SYSTOLIC BLOOD PRESSURE: 105 MMHG

## 2022-01-21 DIAGNOSIS — O02.1 MISSED ABORTION: Primary | ICD-10-CM

## 2022-01-21 DIAGNOSIS — O03.9 SAB (SPONTANEOUS ABORTION): Primary | ICD-10-CM

## 2022-01-21 DIAGNOSIS — N83.201 RIGHT OVARIAN CYST: ICD-10-CM

## 2022-01-21 DIAGNOSIS — O02.1 MISSED ABORTION: ICD-10-CM

## 2022-01-21 LAB
B-HCG SERPL-ACNC: 70 MIU/ML
SL AMB POCT URINE HCG: NEGATIVE

## 2022-01-21 PROCEDURE — 76830 TRANSVAGINAL US NON-OB: CPT | Performed by: OBSTETRICS & GYNECOLOGY

## 2022-01-21 PROCEDURE — 84702 CHORIONIC GONADOTROPIN TEST: CPT

## 2022-01-21 PROCEDURE — 36415 COLL VENOUS BLD VENIPUNCTURE: CPT

## 2022-01-21 PROCEDURE — 81025 URINE PREGNANCY TEST: CPT | Performed by: OBSTETRICS & GYNECOLOGY

## 2022-01-21 PROCEDURE — 99214 OFFICE O/P EST MOD 30 MIN: CPT | Performed by: OBSTETRICS & GYNECOLOGY

## 2022-01-21 PROCEDURE — 84703 CHORIONIC GONADOTROPIN ASSAY: CPT | Performed by: OBSTETRICS & GYNECOLOGY
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
## 2025-05-29 ENCOUNTER — TELEPHONE (OUTPATIENT)
Age: 30
End: 2025-05-29

## 2025-05-29 ENCOUNTER — NURSE TRIAGE (OUTPATIENT)
Age: 30
End: 2025-05-29

## 2025-05-29 DIAGNOSIS — O21.9 NAUSEA AND VOMITING IN PREGNANCY: Primary | ICD-10-CM

## 2025-05-29 RX ORDER — DOXYLAMINE SUCCINATE AND PYRIDOXINE HYDROCHLORIDE, DELAYED RELEASE TABLETS 10 MG/10 MG 10; 10 MG/1; MG/1
1 TABLET, DELAYED RELEASE ORAL DAILY
Qty: 90 TABLET | Refills: 1 | Status: SHIPPED | OUTPATIENT
Start: 2025-05-29 | End: 2025-06-05 | Stop reason: SDUPTHER

## 2025-05-29 NOTE — TELEPHONE ENCOUNTER
Pt called to schedule NP D&V, LMP: 4/18/25. No available appts in all offices, pt prefers [facility] location. Please provide a c/b to assist with scheduling. Thank you.    Last seen in office 1/21/2022.

## 2025-05-29 NOTE — TELEPHONE ENCOUNTER
"REASON FOR CONVERSATION: Morning Sickness    SYMPTOMS: severe nausea and vomiting    OTHER HEALTH INFORMATION: LMP 4/19--hx of Hyperemesis gravidarum. Patient began vomiting all fluids and foods up yesterday morning. Not able to tolerate any PO intake. Last urination was at 0400. Advised to report to ED for evaluation and rehydration. Patient hesitant and states she just needs medications and she will feel better.     PROTOCOL DISPOSITION: Go to ED/UCC Now (Or to Office with PCP Approval)    CARE ADVICE PROVIDED: Reviewed unisom and b6, small frequent meals, small frequent sips of fluids, pretzels before getting out of bed. Call with lightheadedness, dizziness, shortness of breath, palpitations.     PRACTICE FOLLOW-UP: n/a      Reason for Disposition   Unable to keep ANY liquids down (vomits all oral liquids) this past 24 hours    Answer Assessment - Initial Assessment Questions  1. SEVERITY - NAUSEA: \"How bad is the nausea?\" (e.g., none; mild, moderate, severe)      severe  2. SEVERITY - VOMITING: \"Are you vomiting?\" If Yes, ask: \"How many times have you vomited in the past 24 hours?\" (e.g., nausea only; mild, moderate or severe vomiting)      Severe, >20 times  3. ONSET: \"When did the nausea or vomiting begin?\"       Last week  4. FLUIDS: \"What fluids or food have you vomited up today?\" \"Are you able to keep any liquids down?\"      All foods and fluids  5. TREATMENT: \"What have you been doing so far to treat this?\"       Sarai, yogurt, fluids  6. DEHYDRATION: \"When was the last time you urinated?\" \"Are you feeling lightheaded?\" \"Weight loss?\"      Last urination 0400  7. PREGNANCY: \"How many weeks pregnant are you?\" \"How has the pregnancy been going?\"      LMP 4/19  8. JESSICA: \"What date are you expecting to deliver?\"      unsure  9. MEDICINES: \"What medicines are you taking?\" (e.g., iron, opioid pain medicines, prenatal vitamins, vitamin B6)      No  10. OTHER SYMPTOMS: \"Do you have any other symptoms?\" (e.g., " diarrhea, fever)        Denies    Protocols used: Pregnancy - Morning Sickness (Nausea and Vomiting of Pregnancy)-Adult-OH

## 2025-06-05 DIAGNOSIS — O21.9 NAUSEA AND VOMITING IN PREGNANCY: ICD-10-CM

## 2025-06-05 RX ORDER — DOXYLAMINE SUCCINATE AND PYRIDOXINE HYDROCHLORIDE, DELAYED RELEASE TABLETS 10 MG/10 MG 10; 10 MG/1; MG/1
1 TABLET, DELAYED RELEASE ORAL 2 TIMES DAILY
Qty: 90 TABLET | Refills: 1 | Status: SHIPPED | OUTPATIENT
Start: 2025-06-05

## 2025-06-05 NOTE — TELEPHONE ENCOUNTER
"LMP 4/48 6 weeks 6 days   Patient called back and she is still having n/v. She is taking Diclegis, and one dose is not really holding her.   Denies feeling lightheaded or dizzy, voiding normally.   Patient asking for script of Zofran, reviewed Dr. Karrie Cobb's note  that Zofran is not recommended less than 8 weeks due to risk for cleft palate.  D&V scheduled 6/18.  Patient asking if script could be changed to 2 pills daily?  Reviewed Increase fluids as much as possible: start with small frequent sips cold water, use electrolyte replacements such as Gatorade/ Liquid IV/ Pedialyte, cold pops.    Increase to small bland meals when tolerating fluids: rice, toast, crackers, plain noodles. No dairy, no citrus, no spices.   Call back if unable to urinate, unable to tolerate fluids, vomiting or any other concerning symptoms or if feeling dizzy or lightheaded to seek UC/ED.patient verbalzied understanding.   Snack often, and eat small meals - The best foods to eat have lots of protein or carbohydrates, but not a lot of fat. Good choices are crackers, bread, and low-fat yogurt. Avoid spicy foods.   Drink cold, clear beverages that are either fizzy or sour - Good choices are lemonade and ginger ale.   Suck on ginger-flavored lollipops.   Smell fresh lemon, mint, or orange.   Brush your teeth right after you eat.   Do not lie down right after you eat.   Take your vitamins at bedtime with a snack, not in the morning. If your vitamin contains iron, it might help to switch to a vitamin without iron.   Avoid things that make you feel sick - That might include stuffy rooms, strong smells, hot places, loud noises, or not sleeping enough. Try to figure out if some foods and drinks stay down better than others. Avoid foods and drinks that seem to make you feel sick. This is different for different people.   \"Acupressure\" bands - These are bands you can wear on your wrists. They are supposed to reduce morning or motion sickness. " There is not a lot of evidence that these work, but some people feel better if they wear them.    [facility] on call: Dr. COURTNEY Kruse and KAHLIL Giordano- will order.  6/6/25 7:48 patient notified med ordered. Patient verbalzied understanding and thankful. Advised any questions or needs call office.

## 2025-06-17 PROBLEM — O21.0 HYPEREMESIS AFFECTING PREGNANCY, ANTEPARTUM: Status: ACTIVE | Noted: 2025-06-17

## 2025-06-17 PROBLEM — O21.0 HYPEREMESIS GRAVIDARUM: Status: RESOLVED | Noted: 2020-03-31 | Resolved: 2025-06-17

## 2025-06-17 NOTE — PROGRESS NOTES
"Pregnancy Confirmation Visit  [facility] [address], [place], [place]    Assessment/Plan:  30 y.o.  presenting with missed menses.      Patient's last menstrual period was 2025 (exact date).  EDC - 26   @ 8w5d    Sonogram EDC  - Pt declined scan till 20 weeks   She will use her LMP as her dating and wait till then     Unable to find heart tones with doppler today   Pt and  understand that there is no way to know the status of the pregnancy      Hyperemesis - pt is currently using diclegis gave zofran - advised to limit use bc of the cleft risk under 10 weeks  Will try reglan also      Orders placed for labs.     Final EDC 26    by    LMP            - Continue/start prenatal vitamin  - We reviewed her current medications and discussed which are safe to continue in pregnancy  - We briefly discussed options for aneuploidy screening, to be discussed further at the prenatal intake  - Schedule prenatal intake with RN and initial prenatal visit; prenatal labs will be ordered during the prenatal intake      Subjective:    CC: Missed period    Nancy Villarreal is a 30 y.o.  who presents with missed menses.  Patient's last menstrual period was 2025 (exact date).    Patient notes that this pregnancy was planned and desired.  She was not using contraception at the time of conception. She reports she is certain of her LMP and that she has regular menses. She has has no vaginal bleeding since her LMP.    Objective:  /60   Ht 5' 3\" (1.6 m)   Wt 56.7 kg (125 lb)   LMP 2025 (Exact Date)   Breastfeeding No   BMI 22.14 kg/m²     "

## 2025-06-18 ENCOUNTER — TELEPHONE (OUTPATIENT)
Dept: OBGYN CLINIC | Facility: MEDICAL CENTER | Age: 30
End: 2025-06-18

## 2025-06-18 ENCOUNTER — ULTRASOUND (OUTPATIENT)
Dept: OBGYN CLINIC | Facility: MEDICAL CENTER | Age: 30
End: 2025-06-18
Payer: COMMERCIAL

## 2025-06-18 ENCOUNTER — APPOINTMENT (OUTPATIENT)
Dept: LAB | Facility: MEDICAL CENTER | Age: 30
End: 2025-06-18
Payer: COMMERCIAL

## 2025-06-18 VITALS
SYSTOLIC BLOOD PRESSURE: 110 MMHG | HEIGHT: 63 IN | BODY MASS INDEX: 22.15 KG/M2 | WEIGHT: 125 LBS | DIASTOLIC BLOOD PRESSURE: 60 MMHG

## 2025-06-18 DIAGNOSIS — Z36.9 FIRST TRIMESTER SCREENING: ICD-10-CM

## 2025-06-18 DIAGNOSIS — R11.10 HYPEREMESIS: ICD-10-CM

## 2025-06-18 DIAGNOSIS — Z36.9 FIRST TRIMESTER SCREENING: Primary | ICD-10-CM

## 2025-06-18 LAB
BASOPHILS # BLD AUTO: 0.05 THOUSANDS/ÂΜL (ref 0–0.1)
BASOPHILS NFR BLD AUTO: 0 % (ref 0–1)
BILIRUB UR QL STRIP: NEGATIVE
CLARITY UR: CLEAR
COLOR UR: ABNORMAL
EOSINOPHIL # BLD AUTO: 0.1 THOUSAND/ÂΜL (ref 0–0.61)
EOSINOPHIL NFR BLD AUTO: 1 % (ref 0–6)
ERYTHROCYTE [DISTWIDTH] IN BLOOD BY AUTOMATED COUNT: 15.4 % (ref 11.6–15.1)
GLUCOSE UR STRIP-MCNC: NEGATIVE MG/DL
HCT VFR BLD AUTO: 40.2 % (ref 34.8–46.1)
HGB BLD-MCNC: 13.1 G/DL (ref 11.5–15.4)
HGB UR QL STRIP.AUTO: NEGATIVE
IMM GRANULOCYTES # BLD AUTO: 0.06 THOUSAND/UL (ref 0–0.2)
IMM GRANULOCYTES NFR BLD AUTO: 1 % (ref 0–2)
KETONES UR STRIP-MCNC: ABNORMAL MG/DL
LEUKOCYTE ESTERASE UR QL STRIP: NEGATIVE
LYMPHOCYTES # BLD AUTO: 2.71 THOUSANDS/ÂΜL (ref 0.6–4.47)
LYMPHOCYTES NFR BLD AUTO: 21 % (ref 14–44)
MCH RBC QN AUTO: 27.3 PG (ref 26.8–34.3)
MCHC RBC AUTO-ENTMCNC: 32.6 G/DL (ref 31.4–37.4)
MCV RBC AUTO: 84 FL (ref 82–98)
MONOCYTES # BLD AUTO: 0.65 THOUSAND/ÂΜL (ref 0.17–1.22)
MONOCYTES NFR BLD AUTO: 5 % (ref 4–12)
NEUTROPHILS # BLD AUTO: 9.45 THOUSANDS/ÂΜL (ref 1.85–7.62)
NEUTS SEG NFR BLD AUTO: 72 % (ref 43–75)
NITRITE UR QL STRIP: NEGATIVE
NRBC BLD AUTO-RTO: 0 /100 WBCS
PH UR STRIP.AUTO: 6 [PH]
PLATELET # BLD AUTO: 287 THOUSANDS/UL (ref 149–390)
PMV BLD AUTO: 9.3 FL (ref 8.9–12.7)
PROT UR STRIP-MCNC: NEGATIVE MG/DL
RBC # BLD AUTO: 4.8 MILLION/UL (ref 3.81–5.12)
SP GR UR STRIP.AUTO: 1.01 (ref 1–1.03)
UROBILINOGEN UR STRIP-ACNC: <2 MG/DL
WBC # BLD AUTO: 13.02 THOUSAND/UL (ref 4.31–10.16)

## 2025-06-18 PROCEDURE — 87086 URINE CULTURE/COLONY COUNT: CPT

## 2025-06-18 PROCEDURE — 85025 COMPLETE CBC W/AUTO DIFF WBC: CPT

## 2025-06-18 PROCEDURE — 99203 OFFICE O/P NEW LOW 30 MIN: CPT | Performed by: OBSTETRICS & GYNECOLOGY

## 2025-06-18 PROCEDURE — 36415 COLL VENOUS BLD VENIPUNCTURE: CPT

## 2025-06-18 PROCEDURE — 81003 URINALYSIS AUTO W/O SCOPE: CPT

## 2025-06-18 RX ORDER — METOCLOPRAMIDE 10 MG/1
10 TABLET ORAL 4 TIMES DAILY
Qty: 20 TABLET | Refills: 0 | Status: SHIPPED | OUTPATIENT
Start: 2025-06-18

## 2025-06-18 RX ORDER — ONDANSETRON 4 MG/1
4 TABLET, FILM COATED ORAL EVERY 8 HOURS PRN
Qty: 20 TABLET | Refills: 0 | Status: SHIPPED | OUTPATIENT
Start: 2025-06-18

## 2025-06-18 NOTE — TELEPHONE ENCOUNTER
Who called:STAFF     Is the patient Pregnant ? Yes   If so, How many weeks? 8 weeks lmp 04/18/25    Reason for the Call: To schedule Initial Ob and more     Action Taken: Phone was just ringing no answer      Outcome/Plan/ Recommendations:  Pt need an Initial ob, I called her but her phone was just ringing LMP 04/18/2025

## 2025-06-19 ENCOUNTER — TELEPHONE (OUTPATIENT)
Age: 30
End: 2025-06-19

## 2025-06-19 LAB — BACTERIA UR CULT: NORMAL

## 2025-06-20 ENCOUNTER — TELEPHONE (OUTPATIENT)
Dept: OBGYN CLINIC | Facility: MEDICAL CENTER | Age: 30
End: 2025-06-20

## 2025-06-20 DIAGNOSIS — N91.2 AMENORRHEA: Primary | ICD-10-CM

## 2025-07-02 NOTE — PATIENT INSTRUCTIONS
Congratulations!! Please review our Pregnancy Essential Guide and [facility] L&D Virtual tour from our networks website.    [facility] Pregnancy Essentials Guide  [facility] ([email or web address])     [facility] ([place])

## 2025-07-02 NOTE — PROGRESS NOTES
Telemedicine Consent  The patient was identified by name and date of birth and was informed that this is a virtual visit being conducted through a secure, HIPAA-complaint platform. I am in a private office space with the door closed. Patient acknowledged understanding of privacy.  She agrees to proceed and is aware that she may discontinue the visit at any time.    OB INTAKE INTERVIEW 2025    Patient is 30 y.o. who presents for OB intake at 10w5d.  The father of her baby (José) is involved in the pregnancy.      Patient's last menstrual period was 2025 (exact date).  Ultrasound: Declined  Estimated Date of Delivery: 26 by LMP - Patient declined D&V US    Signs/Symptoms of Pregnancy  Current pregnancy symptoms: fatigue, nausea, and vomiting  Constipation yes  Headaches yes  Cramping/spotting no  PICA cravings no    Diabetes  If patient has 1 or more, please order early 1 hour GTT  History of GDM no  BMI >35 no  History of PCOS or current metformin use no  History of LGA/macrosomic infant (4000g/9lbs) no    If patient has 2 or more, please order early 1 hour GTT  BMI>30 no  Patient's age 35 years or older as of estimated date of delivery (AMA) no  First degree relative with type 2 diabetes no  History of chronic HTN, hyperlipidemia, elevated A1C no  High risk race (, , ,  or ) no    Hypertension  History of of chronic HTN no  History of gestational HTN no  History of preeclampsia, eclampsia, or HELLP syndrome no  History of diabetes no  History of lupus, sjogrens syndrome, kidney disease no    Thyroid  History of thyroid disease no    Bleeding Disorder or Hx of DVT (Factor V, antithrombin III, prothrombin gene mutation, protein C and S Ag, lupus anticoagulant, anticardiolipin, beta-2 glycoprotein): no    OB/GYN:  History of abnormal pap smear no       Date of last pap smear 2020  History of HPV no  History of Herpes/HSV  no  History of other STI (gonorrhea, chlamydia, trich) no  History of prior  yes  History of prior  no  History of  delivery prior to 36 weeks 6 days yes - twin delivery in 2020 @ 35w3d  History of blood transfusion no  Ok for blood transfusion yes    Substance screening:  History of tobacco use no  Currently using tobacco no  Substance Use Screen Level - no risk    MRSA Screening:   Does the pt have a hx of MRSA? no    Immunizations:  History of Varicella  as a child  Influenza vaccine given this season n/a - Declined  Discussed Tdap vaccine NO - Declined  COVID Vaccine NO - Declined    Genetic/M:  Do you or your partner have a history of any of the following in yourselves or first degree relatives?  Cystic fibrosis no  Spinal muscular atrophy no  Hemoglobinopathy/Sickle Cell/Thalassemia no  Fragile X Intellectual Disability no    Declined genetic testing.    Appointment for Anatomy Ultrasound at [facility] is scheduled for  - Declined NT.    Interview education  [facility] P[facility] Essentials Book reviewed, discussed and attached to their AVS YES     Prenatal lab work scripts : encouraged to compete remaining labs.    Aspirin/Preeclampsia Screen    Risk Level Risk Factor Recommendation   LOW Prior Uncomplicated full-term delivery yes No Aspirin recommendation        MODERATE Nulliparity no Recommend low-dose aspirin if     BMI>30 no 2 or more moderate risk factors    Family History Preeclampsia (mother/sister) no     35yr old or greater as of JESSICA no     Black Race, Concern for SDOH/Low Socioeconomic no     IVF Pregnancy  no     Personal History Risks (low birth weight, prior adverse preg outcome, >10yr preg interval) no         HIGH History of Preeclampsia no Recommend low-dose aspirin if     Multifetal gestation no 1 or more high risk factors    Chronic HTN no     Type 1 or 2 Diabetes no     Renal Disease no     Autoimmune Disease  no      Contraindications to ASA therapy:  NSAID/ ASA allergy:  no  Nasal polyps: no  Asthma with history of ASA induced bronchospasm: no  Relative contraindications:  History of GI bleed: no  Active peptic ulcer disease: no  Severe hepatic dysfunction: no    Patient does not meet recommendation to take ASA 162mg during this pregnancy from 12-36wks to lower her risk of preeclampsia.     The patient has a history now or in prior pregnancy notable for:   and twins in .     Details that I feel the provider should be aware of: Nancy came in for her OB intake at 10 w 6 d. Patient c/o fatigue, N/V, constipation and HA. Care advise provided. Safe medications to take during pregnancy and warning signs reviewed. Encouraged to complete remaining labs. Patient declined NT. Has anatomy scan scheduled . EPDS score of 13 today. Patient reports is mostly related to her HG. Doing much better since started taking Zofran. Infusions discussed. Patient will think about it and let me know if she would like to start them.     PN1 visit scheduled. The patient was oriented to our practice, the navigator role, reviewed delivering physicians and [facility] for delivery. All questions were answered.    Interviewed by: Robi JIANG RN

## 2025-07-03 ENCOUNTER — INITIAL PRENATAL (OUTPATIENT)
Dept: OBGYN CLINIC | Facility: MEDICAL CENTER | Age: 30
End: 2025-07-03

## 2025-07-03 DIAGNOSIS — Z34.81 PRENATAL CARE, SUBSEQUENT PREGNANCY, FIRST TRIMESTER: Primary | ICD-10-CM

## 2025-07-03 PROCEDURE — OBC

## 2025-07-14 DIAGNOSIS — R11.10 HYPEREMESIS: ICD-10-CM

## 2025-07-14 DIAGNOSIS — Z36.9 FIRST TRIMESTER SCREENING: ICD-10-CM

## 2025-07-14 RX ORDER — ONDANSETRON 4 MG/1
4 TABLET, FILM COATED ORAL EVERY 8 HOURS PRN
Qty: 20 TABLET | Refills: 0 | Status: SHIPPED | OUTPATIENT
Start: 2025-07-14

## 2025-08-07 DIAGNOSIS — Z36.9 FIRST TRIMESTER SCREENING: ICD-10-CM

## 2025-08-07 DIAGNOSIS — R11.10 HYPEREMESIS: ICD-10-CM

## 2025-08-07 RX ORDER — ONDANSETRON 4 MG/1
4 TABLET, FILM COATED ORAL EVERY 8 HOURS PRN
Qty: 20 TABLET | Refills: 2 | Status: SHIPPED | OUTPATIENT
Start: 2025-08-07